# Patient Record
Sex: MALE | Race: WHITE | NOT HISPANIC OR LATINO | Employment: OTHER | URBAN - METROPOLITAN AREA
[De-identification: names, ages, dates, MRNs, and addresses within clinical notes are randomized per-mention and may not be internally consistent; named-entity substitution may affect disease eponyms.]

---

## 2017-01-17 ENCOUNTER — OFFICE VISIT (OUTPATIENT)
Dept: ORTHOPEDIC SURGERY | Facility: CLINIC | Age: 62
End: 2017-01-17

## 2017-01-17 VITALS — WEIGHT: 184 LBS | BODY MASS INDEX: 28.88 KG/M2 | HEIGHT: 67 IN

## 2017-01-17 DIAGNOSIS — M48.061 SPINAL STENOSIS OF LUMBAR REGION: ICD-10-CM

## 2017-01-17 DIAGNOSIS — M48.061 SPINAL STENOSIS OF LUMBAR REGION: Primary | ICD-10-CM

## 2017-01-17 DIAGNOSIS — M54.50 LUMBAR SPINE PAIN: Primary | ICD-10-CM

## 2017-01-17 PROCEDURE — 72100 X-RAY EXAM L-S SPINE 2/3 VWS: CPT | Performed by: ORTHOPAEDIC SURGERY

## 2017-01-17 PROCEDURE — 99214 OFFICE O/P EST MOD 30 MIN: CPT | Performed by: ORTHOPAEDIC SURGERY

## 2017-01-17 RX ORDER — DOXYCYCLINE 40 MG/1
CAPSULE ORAL
Refills: 3 | COMMUNITY
Start: 2017-01-03 | End: 2018-12-19

## 2017-01-17 RX ORDER — LAMOTRIGINE 100 MG/1
TABLET ORAL
Refills: 1 | COMMUNITY
Start: 2017-01-03 | End: 2018-12-19

## 2017-01-17 RX ORDER — METOPROLOL SUCCINATE 50 MG/1
TABLET, EXTENDED RELEASE ORAL
Refills: 0 | COMMUNITY
Start: 2016-12-25 | End: 2018-12-19

## 2017-01-17 RX ORDER — VITAMIN B COMPLEX
CAPSULE ORAL DAILY
COMMUNITY
End: 2018-12-19

## 2017-01-17 NOTE — MR AVS SNAPSHOT
Albert Tellez   2017 9:45 AM   Office Visit    Dept Phone:  [phone]   Encounter #:  [id number]    Provider:  Shane Ridley MD   Department:  [facility] MEDICAL [facility]                Your Full Care Plan              Your Updated Medication List          This list is accurate as of: 17 10:45 AM.  Always use your most recent med list.                doxycycline 40 MG capsule   Commonly known as:  ORACEA       Fish Oil oil       lamoTRIgine 100 MG tablet   Commonly known as:  LaMICtal       metoprolol succinate XL 50 MG 24 hr tablet   Commonly known as:  TOPROL-XL       vitamin b complex capsule capsule       ZOSTAVAX 77756 UNT/0.65ML injection   Generic drug:  zoster vaccine live PF               We Performed the Following     XR Spine Lumbar AP & Lateral       You Were Diagnosed With        Codes Comments    Lumbar spine pain    -  Primary ICD-10-CM: M54.5  ICD-9-CM: 724.2     Spinal stenosis of lumbar region     ICD-10-CM: M48.06  ICD-9-CM: 724.02       Instructions     None    Patient Instructions History      Upcoming Appointments     Visit Type Date Time Department    FOLLOW UP 2017  9:45 AM [facility]    FOLLOW UP 2017  9:00 AM [facility]      [employer] Signup     [facility] [employer] allows you to send messages to your doctor, view your test results, renew your prescriptions, schedule appointments, and more. To sign up, go to [employer] and click on the Sign Up Now link in the New User? box. Enter your [employer] Activation Code exactly as it appears below along with the last four digits of your Social Security Number and your Date of Birth () to complete the sign-up process. If you do not sign up before the expiration date, you must request a new code.    [employer] Activation Code: [email or web address]  Expires: 2017 10:45 AM    If you have questions, you can email [email or web address]  "or call [phone] to talk to our MyChart staff. Remember, [employer] is NOT to be used for urgent needs. For medical emergencies, dial 911.               Other Info from Your Visit           Your Appointments     Jan 31, 2017  9:00 AM EST   Follow Up with Duncan Hernandez MD   [facility] (--)    [address] [place] [address]   [phone]           Arrive 15 minutes prior to appointment.              Allergies     No Known Allergies      Reason for Visit     Lumbar Spine - Establish Care           Vital Signs     Height Weight Body Mass Index Smoking Status          67\" (170.2 cm) 184 lb (83.5 kg) 28.82 kg/m2 Unknown If Ever Smoked        Problems and Diagnoses Noted     Low back pain    -  Primary    Narrowing of spinal canal            "

## 2017-01-17 NOTE — PROGRESS NOTES
New patient or new problem visit    Chief Complaint   Patient presents with   • Lumbar Spine - Establish Care       HPI: Continued complaints of bilateral pain in the legs.  I last saw him in November 2014 and sent him for epidurals which didn't help a lot and certainly didn't last.  He went to physical therapy once.  The pain is severe, incredible radiating into the left hip and leg and also some right thigh pain.  He states is fairly symmetrical but the left hurts more.  He has no low back pain.    PFSH: See chart- reviewed    Review of Systems    PE: Constitutional: Vital signs above-noted.  Awake, alert and oriented    Psychiatric: Affect and insight do not appear grossly disturbed.    Pulmonary: Breathing is unlabored, color is good.    Skin: Warm, dry and normal turgor    Cardiac:  pedal pulses intact.  No edema.    Eyesight and hearing appear adequate for examination purposes      Musculoskeletal:  There is no tenderness to percussion and palpation of the spine. Motion appears undisturbed.  Posture is unremarkable to coronal and sagittal inspection.    The skin about the area is intact.  There is no palpable or visible deformity.  There is no local spasm.       Neurologic:   Reflexes are 2+ and symmetrical in the patellae and absent in the Achilles.   Motor function is undisturbed in quadriceps, EHL, and gastrocnemius      Sensation appears symmetrically intact to light touch   .  In the bilateral lower extremities there is no evidence of atrophy.   Clonus is absent..  Gait appears undisturbed.  SLR test negative      MEDICAL DECISION MAKING    XRAY: Plain film lumbar two-view x-rays show multilevel spondylosis and a well-maintained lordosis, essentially unchanged from 2 years ago.  MRI scan from that time demonstrated moderate stenosis at L3 4 and L4 5.    Other: n/a    Impression: Worsening lumbar spinal stenosis probably L3 4 and L4 5    Plan: Repeat MRI scan of the lumbar spine with an eye toward  laminectomy. I discussed the risks and benefits of laminectomy.  Risks include adverse anesthetic events including death, stroke and myocardial infarction.  More specific surgical complications include infection, nerve root injury and/or paralysis, persistent pain, recurrent pain, spinal fluid leakage, painful scarring, and increased back pain and/or instability, among others. There is a 70 to 90 percent chance of success.   Alternatives were discussed.  After careful consideration the patient wishes to proceed with surgery.

## 2017-01-17 NOTE — LETTER
January 17, 2017     Patient: Albert Tellez   YOB: 1955   Date of Visit: 1/17/2017       To Whom It May Concern:    It is my medical opinion that Albert Tellez may return to light duty immediately with the following restrictions: Avoid lifting greater than 50 pounds.  He has significant leg pain from a back malady and may need further treatment.           Sincerely,        hSane Ridley MD    CC: Albert Tellez

## 2017-01-17 NOTE — LETTER
January 17, 2017     Luci Ramos MD  [address]  [address]  [facility] [address]    Patient: Albert Tellez   YOB: 1955   Date of Visit: 1/17/2017       Dear Dr. Richard MD:    Thank you for referring Albert Tellez to me for evaluation. Below are the relevant portions of my assessment and plan of care.    If you have questions, please do not hesitate to call me. I look forward to following lAbert along with you.         Sincerely,        Shane Ridley MD        CC: No Recipients  Shane Ridley MD  1/17/2017 10:36 AM  Signed  New patient or new problem visit    Chief Complaint   Patient presents with   • Lumbar Spine - Establish Care       HPI: Continued complaints of bilateral pain in the legs.  I last saw him in November 2014 and sent him for epidurals which didn't help a lot and certainly didn't last.  He went to physical therapy once.  The pain is severe, incredible radiating into the left hip and leg and also some right thigh pain.  He states is fairly symmetrical but the left hurts more.  He has no low back pain.    PFSH: See chart- reviewed    Review of Systems    PE: Constitutional: Vital signs above-noted.  Awake, alert and oriented    Psychiatric: Affect and insight do not appear grossly disturbed.    Pulmonary: Breathing is unlabored, color is good.    Skin: Warm, dry and normal turgor    Cardiac:  pedal pulses intact.  No edema.    Eyesight and hearing appear adequate for examination purposes      Musculoskeletal:  There is no tenderness to percussion and palpation of the spine. Motion appears undisturbed.  Posture is unremarkable to coronal and sagittal inspection.    The skin about the area is intact.  There is no palpable or visible deformity.  There is no local spasm.       Neurologic:   Reflexes are 2+ and symmetrical in the patellae and absent in the Achilles.   Motor function is undisturbed in quadriceps, EHL, and gastrocnemius      Sensation appears  symmetrically intact to light touch   .  In the bilateral lower extremities there is no evidence of atrophy.   Clonus is absent..  Gait appears undisturbed.  SLR test negative      MEDICAL DECISION MAKING    XRAY: Plain film lumbar two-view x-rays show multilevel spondylosis and a well-maintained lordosis, essentially unchanged from 2 years ago.  MRI scan from that time demonstrated moderate stenosis at L3 4 and L4 5.    Other: n/a    Impression: Worsening lumbar spinal stenosis probably L3 4 and L4 5    Plan: Repeat MRI scan of the lumbar spine with an eye toward laminectomy. I discussed the risks and benefits of laminectomy.  Risks include adverse anesthetic events including death, stroke and myocardial infarction.  More specific surgical complications include infection, nerve root injury and/or paralysis, persistent pain, recurrent pain, spinal fluid leakage, painful scarring, and increased back pain and/or instability, among others. There is a 70 to 90 percent chance of success.   Alternatives were discussed.  After careful consideration the patient wishes to proceed with surgery.

## 2017-01-27 ENCOUNTER — HOSPITAL ENCOUNTER (OUTPATIENT)
Dept: MRI IMAGING | Facility: HOSPITAL | Age: 62
Discharge: HOME | End: 2017-01-27
Attending: ORTHOPAEDIC SURGERY | Admitting: ORTHOPAEDIC SURGERY

## 2017-01-27 DIAGNOSIS — M48.061 SPINAL STENOSIS OF LUMBAR REGION: ICD-10-CM

## 2017-01-27 PROCEDURE — 72148 MRI LUMBAR SPINE W/O DYE: CPT

## 2017-01-31 ENCOUNTER — OFFICE VISIT (OUTPATIENT)
Dept: ORTHOPEDIC SURGERY | Facility: CLINIC | Age: 62
End: 2017-01-31

## 2017-01-31 VITALS — BODY MASS INDEX: 28.88 KG/M2 | HEIGHT: 67 IN | WEIGHT: 184 LBS

## 2017-01-31 DIAGNOSIS — Z98.890 HISTORY OF HIP SURGERY: Primary | ICD-10-CM

## 2017-01-31 PROCEDURE — 73502 X-RAY EXAM HIP UNI 2-3 VIEWS: CPT | Performed by: NURSE PRACTITIONER

## 2017-01-31 PROCEDURE — 99212 OFFICE O/P EST SF 10 MIN: CPT | Performed by: NURSE PRACTITIONER

## 2017-01-31 NOTE — PROGRESS NOTES
"Patient: Albert Tellez  YOB: 1955 62 y.o. male  Medical Record Number: [id number]    Chief Complaints:   Chief Complaint   Patient presents with   • Left Hip - Follow-up       History of Present Illness:Albert Tellez is a 62 y.o. male who presents for follow-up of  left hip resurfacing.  Patient is now almost 6 years out.  He has had ongoing problems with his lower back and is most recently seen Dr. Ridley and just had an MRI to reevaluate his back.  He is here today to make sure that his pain is not related to his hip.  Specifically he is having pain in his left lower back going all the way down his left leg into his foot.  He also has numbness and tingling in feelings as though\" his leg is asleep\" this is been going on for several months.  The patient denies any specific injury he is been doing quite a bit of lifting at work.    Allergies: No Known Allergies    Medications:   Current Outpatient Prescriptions   Medication Sig Dispense Refill   • B Complex Vitamins (VITAMIN B COMPLEX) capsule capsule Take  by mouth Daily.     • doxycycline (ORACEA) 40 MG capsule TAKE ONE CAPSULE BY MOUTH EVERY DAY  3   • Fish Oil oil Daily.     • lamoTRIgine (LaMICtal) 100 MG tablet TAKE 1 TABLET DAILY.  1   • metoprolol succinate XL (TOPROL-XL) 50 MG 24 hr tablet TAKE 1 TABLET BY MOUTH DAILY  0   • ZOSTAVAX 23734 UNT/0.65ML injection ADM 0.65ML SC UTD  0     No current facility-administered medications for this visit.          The following portions of the patient's history were reviewed and updated as appropriate: allergies, current medications, past family history, past medical history, past social history, past surgical history and problem list.    Review of Systems:   A 14 point review of systems was performed. All systems negative except pertinent positives/negative listed in HPI above    Physical Exam:   Vitals:    01/31/17 0934   Weight: 184 lb (83.5 kg)   Height: 67\" (170.2 cm)       General: A and O x 3, " ASA, NAD    SCLERA:    Normal    DENTITION:   Normal  Skin clear no unusual lesions noted  Left hip patient is nontender palpation he has excellent range of motion with no instability calf is soft and nontender    Radiology:  Xrays 2 views left hip were ordered and reviewed today secondary to leg pain and show well-placed well-positioned left hip resurfacing.  Comparative views are unchanged    Assessment/Plan:  Left hip resurfacing stable  Low back pain with radiculopathy    Dr. Taveras saw the patient as well.  Clearly this is coming from his back.  He will make appointment with Dr. Ridley to further discuss options

## 2017-01-31 NOTE — MR AVS SNAPSHOT
Albert Tellez   2017 9:00 AM   Office Visit    Dept Phone:  [phone]   Encounter #:  [id number]    Provider:  LIANNE Estrada   Department:  [facility] MEDICAL [facility]                Your Full Care Plan              Your Updated Medication List          This list is accurate as of: 17 10:12 AM.  Always use your most recent med list.                doxycycline 40 MG capsule   Commonly known as:  ORACEA       Fish Oil oil       lamoTRIgine 100 MG tablet   Commonly known as:  LaMICtal       metoprolol succinate XL 50 MG 24 hr tablet   Commonly known as:  TOPROL-XL       vitamin b complex capsule capsule       ZOSTAVAX 34243 UNT/0.65ML injection   Generic drug:  zoster vaccine live PF               We Performed the Following     XR Hip With or Without Pelvis 2 - 3 View Left       You Were Diagnosed With        Codes Comments    History of hip surgery    -  Primary ICD-10-CM: Z98.890  ICD-9-CM: V45.89       Instructions     None    Patient Instructions History      Upcoming Appointments     Visit Type Date Time Department    FOLLOW UP 2017  9:00 AM [facility]    FOLLOW UP 2/15/2017 11:20 AM [facility]      [employer] Signup     [facility] [employer] allows you to send messages to your doctor, view your test results, renew your prescriptions, schedule appointments, and more. To sign up, go to [employer] and click on the Sign Up Now link in the New User? box. Enter your [employer] Activation Code exactly as it appears below along with the last four digits of your Social Security Number and your Date of Birth () to complete the sign-up process. If you do not sign up before the expiration date, you must request a new code.    [employer] Activation Code: [email or web address]  Expires: 2017 10:45 AM    If you have questions, you can email [email or web address] or call [phone] to talk to our [employer] staff.  "Remember, MyChart is NOT to be used for urgent needs. For medical emergencies, dial 911.               Other Info from Your Visit           Your Appointments     Feb 15, 2017 11:20 AM EST   Follow Up with Shane Ridley MD   [facility] (--)    [address] [place] [address]   [phone]           Arrive 15 minutes prior to appointment.              Allergies     No Known Allergies      Reason for Visit     Left Hip - Follow-up           Vital Signs     Height Weight Body Mass Index Smoking Status          67\" (170.2 cm) 184 lb (83.5 kg) 28.82 kg/m2 Unknown If Ever Smoked        Problems and Diagnoses Noted     History of hip surgery    -  Primary        "

## 2017-02-01 ENCOUNTER — TELEPHONE (OUTPATIENT)
Dept: ORTHOPEDIC SURGERY | Facility: CLINIC | Age: 62
End: 2017-02-01

## 2017-02-01 NOTE — TELEPHONE ENCOUNTER
----- Message from Shane Ridley MD sent at 1/31/2017 12:51 PM EST -----  Tell him lumbar MRI shows pinched nerves at L3-4 and L4-5.  See if he wants to schedule L3-4,L4-5 laminectomy

## 2017-02-06 ENCOUNTER — TELEPHONE (OUTPATIENT)
Dept: ORTHOPEDIC SURGERY | Facility: CLINIC | Age: 62
End: 2017-02-06

## 2017-02-07 ENCOUNTER — TELEPHONE (OUTPATIENT)
Dept: ORTHOPEDIC SURGERY | Facility: CLINIC | Age: 62
End: 2017-02-07

## 2018-10-23 ENCOUNTER — TELEPHONE (OUTPATIENT)
Dept: ORTHOPEDIC SURGERY | Facility: CLINIC | Age: 63
End: 2018-10-23

## 2018-11-14 ENCOUNTER — OFFICE VISIT (OUTPATIENT)
Dept: ORTHOPEDIC SURGERY | Facility: CLINIC | Age: 63
End: 2018-11-14

## 2018-11-14 VITALS — TEMPERATURE: 98.5 F | WEIGHT: 180 LBS | BODY MASS INDEX: 28.25 KG/M2 | HEIGHT: 67 IN

## 2018-11-14 DIAGNOSIS — M54.50 SPINE PAIN, LUMBAR: Primary | ICD-10-CM

## 2018-11-14 DIAGNOSIS — M48.061 SPINAL STENOSIS OF LUMBAR REGION, UNSPECIFIED WHETHER NEUROGENIC CLAUDICATION PRESENT: ICD-10-CM

## 2018-11-14 PROCEDURE — 99214 OFFICE O/P EST MOD 30 MIN: CPT | Performed by: ORTHOPAEDIC SURGERY

## 2018-11-14 PROCEDURE — 72100 X-RAY EXAM L-S SPINE 2/3 VWS: CPT | Performed by: ORTHOPAEDIC SURGERY

## 2018-11-14 NOTE — PROGRESS NOTES
Returns with predominant hip and buttock and radiating thigh pain right more than left radiating pain much more than back pain.  Epidurals helped the sporadically but conservative care has failed.  The he wants to proceed with surgery on exam he has good strength in lower extremities bilaterally good patellar reflexes I did not test Achilles sensation appears grossly intact.  MRI from January 2017 shows stenosis at L3 4 and 45 for which laminectomy at previously been planned.  X-rays obtained today show good lumbar lordosis no instability perhaps a hint of retrolisthesis at L2-3 no change from prior films.  Think he is an excellent candidate for an L3 4 4 5 laminectomy provided the MRI has not changed, and we will obtain a new one.I discussed the risks and benefits of posterior spinal fusion, including where applicable, laminectomy.  Risks include adverse anesthetic events such as death, stroke and myocardial infarction.  More specific surgical risks include infection, nonunion, hardware failure, spinal fluid leakage, nerve root injury or paralysis, visceral or vascular injury, persistent pain, deep venous thrombosis, and pulmonary embolism among others. There is a 70 to 90 % chance of success.   Alternatives have been discussed.  After careful consideration the patient wishes to proceed with surgery.  I will call him with results of the MRI scan.  Risk and benefits of laminectomy were reiterated along with the advantages of choosing laminectomy over addition of fusion at this time

## 2018-11-16 ENCOUNTER — HOSPITAL ENCOUNTER (OUTPATIENT)
Dept: MRI IMAGING | Facility: HOSPITAL | Age: 63
Discharge: HOME | End: 2018-11-16
Attending: ORTHOPAEDIC SURGERY | Admitting: ORTHOPAEDIC SURGERY

## 2018-11-16 DIAGNOSIS — M48.061 SPINAL STENOSIS OF LUMBAR REGION, UNSPECIFIED WHETHER NEUROGENIC CLAUDICATION PRESENT: ICD-10-CM

## 2018-11-16 PROCEDURE — 72148 MRI LUMBAR SPINE W/O DYE: CPT

## 2018-11-19 ENCOUNTER — TELEPHONE (OUTPATIENT)
Dept: ORTHOPEDIC SURGERY | Facility: CLINIC | Age: 63
End: 2018-11-19

## 2018-11-19 ENCOUNTER — PREP FOR SURGERY (OUTPATIENT)
Dept: OTHER | Facility: HOSPITAL | Age: 63
End: 2018-11-19

## 2018-11-19 DIAGNOSIS — M48.062 SPINAL STENOSIS OF LUMBAR REGION WITH NEUROGENIC CLAUDICATION: Primary | ICD-10-CM

## 2018-11-19 RX ORDER — CEFAZOLIN SODIUM 2 G/100ML
2 INJECTION, SOLUTION INTRAVENOUS ONCE
Status: CANCELLED | OUTPATIENT
Start: 2018-12-27 | End: 2018-11-19

## 2018-12-12 PROBLEM — M48.062 SPINAL STENOSIS OF LUMBAR REGION WITH NEUROGENIC CLAUDICATION: Status: ACTIVE | Noted: 2018-12-12

## 2018-12-17 ENCOUNTER — TELEPHONE (OUTPATIENT)
Dept: ORTHOPEDIC SURGERY | Facility: CLINIC | Age: 63
End: 2018-12-17

## 2018-12-17 NOTE — TELEPHONE ENCOUNTER
Call return to the patient.  He was supposed to have some preadmission testing this week but wasn't sure what time or where to go.  Received the paperwork that was mailed to him and advised him that all the information is on his paperwork.  His preadmission testing is scheduled at 9 AM on December 19.

## 2018-12-17 NOTE — TELEPHONE ENCOUNTER
PLEASE CALL PT TO DISCUSS SOME POST OP QUESTIONS. I HAVE ANSWERED AS MANY AS I CAN AND I MAILED HIM 3 OF THE BACK SURGERY BOOKLETS.        ANNA

## 2018-12-19 ENCOUNTER — APPOINTMENT (OUTPATIENT)
Dept: PREADMISSION TESTING | Facility: HOSPITAL | Age: 63
End: 2018-12-19

## 2018-12-19 VITALS
SYSTOLIC BLOOD PRESSURE: 159 MMHG | TEMPERATURE: 97.9 F | RESPIRATION RATE: 16 BRPM | DIASTOLIC BLOOD PRESSURE: 89 MMHG | HEIGHT: 67 IN | BODY MASS INDEX: 27.78 KG/M2 | HEART RATE: 80 BPM | OXYGEN SATURATION: 96 % | WEIGHT: 177 LBS

## 2018-12-19 LAB
ANION GAP SERPL CALCULATED.3IONS-SCNC: 9.7 MMOL/L
BUN BLD-MCNC: 12 MG/DL (ref 8–23)
BUN/CREAT SERPL: 14.5 (ref 7–25)
CALCIUM SPEC-SCNC: 9.2 MG/DL (ref 8.6–10.5)
CHLORIDE SERPL-SCNC: 101 MMOL/L (ref 98–107)
CO2 SERPL-SCNC: 25.3 MMOL/L (ref 22–29)
CREAT BLD-MCNC: 0.83 MG/DL (ref 0.76–1.27)
DEPRECATED RDW RBC AUTO: 50.3 FL (ref 37–54)
ERYTHROCYTE [DISTWIDTH] IN BLOOD BY AUTOMATED COUNT: 14.4 % (ref 11.5–14.5)
GFR SERPL CREATININE-BSD FRML MDRD: 114 ML/MIN/1.73
GFR SERPL CREATININE-BSD FRML MDRD: 94 ML/MIN/1.73
GLUCOSE BLD-MCNC: 102 MG/DL (ref 65–99)
HCT VFR BLD AUTO: 39.7 % (ref 40.4–52.2)
HGB BLD-MCNC: 12.9 G/DL (ref 13.7–17.6)
MCH RBC QN AUTO: 31.2 PG (ref 27–32.7)
MCHC RBC AUTO-ENTMCNC: 32.5 G/DL (ref 32.6–36.4)
MCV RBC AUTO: 96.1 FL (ref 79.8–96.2)
PLATELET # BLD AUTO: 285 10*3/MM3 (ref 140–500)
PMV BLD AUTO: 9.8 FL (ref 6–12)
POTASSIUM BLD-SCNC: 4.4 MMOL/L (ref 3.5–5.2)
RBC # BLD AUTO: 4.13 10*6/MM3 (ref 4.6–6)
SODIUM BLD-SCNC: 136 MMOL/L (ref 136–145)
WBC NRBC COR # BLD: 7.3 10*3/MM3 (ref 4.5–10.7)

## 2018-12-19 PROCEDURE — 93010 ELECTROCARDIOGRAM REPORT: CPT | Performed by: INTERNAL MEDICINE

## 2018-12-19 PROCEDURE — 93005 ELECTROCARDIOGRAM TRACING: CPT

## 2018-12-19 PROCEDURE — 36415 COLL VENOUS BLD VENIPUNCTURE: CPT

## 2018-12-19 PROCEDURE — 80048 BASIC METABOLIC PNL TOTAL CA: CPT | Performed by: ORTHOPAEDIC SURGERY

## 2018-12-19 PROCEDURE — 85027 COMPLETE CBC AUTOMATED: CPT | Performed by: ORTHOPAEDIC SURGERY

## 2018-12-19 RX ORDER — DOXYCYCLINE HYCLATE 100 MG/1
100 CAPSULE ORAL DAILY
COMMUNITY

## 2018-12-19 NOTE — DISCHARGE INSTRUCTIONS
Take the following medications the morning of surgery with a small sip of water: NONE        General Instructions:  • Do not eat solid food after midnight the night before surgery.  • You may drink clear liquids day of surgery but must stop at least one hour before your hospital arrival time.  • It is beneficial for you to have a clear drink that contains carbohydrates the day of surgery.  We suggest a 12 to 20 ounce bottle of Gatorade or Powerade for non-diabetic patients or a 12 to 20 ounce bottle of G2 or Powerade Zero for diabetic patients.     Clear liquids are liquids you can see through.  Nothing red in color.     Plain water                               Sports drinks  Sodas                                   Gelatin (Jell-O)  Fruit juices without pulp such as white grape juice and apple juice  Popsicles that contain no fruit or yogurt  Tea or coffee (no cream or milk added)  Gatorade / Powerade  G2 / Powerade Zero    • Bring any papers given to you in the doctor’s office.  • Wear clean comfortable clothes and socks.  • Do not wear contact lenses or make-up.  Bring a case for your glasses.   • Remove all piercings.  Leave jewelry and any other valuables at home.  • The Pre-Admission Testing nurse will instruct you to bring medications if unable to obtain an accurate list in Pre-Admission Testing.            Preventing a Surgical Site Infection:  • For 2 to 3 days before surgery, avoid shaving with a razor because the razor can irritate skin and make it easier to develop an infection.    • Any areas of open skin can increase the risk of a post-operative wound infection by allowing bacteria to enter and travel throughout the body.  Notify your surgeon if you have any skin wounds / rashes even if it is not near the expected surgical site.  The area will need assessed to determine if surgery should be delayed until it is healed.  • The night prior to surgery sleep in a clean bed with clean clothing.  Do not allow  pets to sleep with you.  • Shower on the morning of surgery using a fresh bar of anti-bacterial soap (such as Dial) and clean washcloth.  Dry with a clean towel and dress in clean clothing.  • Ask your surgeon if you will be receiving antibiotics prior to surgery.  • Make sure you, your family, and all healthcare providers clean their hands with soap and water or an alcohol based hand  before caring for you or your wound.    Day of surgery: 12/27/2018. MAIN OR. ARRIVAL TIME 530 AM  Upon arrival, a Pre-op nurse and Anesthesiologist will review your health history, obtain vital signs, and answer questions you may have.  The only belongings needed at this time will be your home medications and if applicable your C-PAP/BI-PAP machine.  If you are staying overnight your family can leave the rest of your belongings in the car and bring them to your room later.  A Pre-op nurse will start an IV and you may receive medication in preparation for surgery, including something to help you relax.  Your family will be able to see you in the Pre-op area.  While you are in surgery your family should notify the waiting room  if they leave the waiting room area and provide a contact phone number.    Please be aware that surgery does come with discomfort.  We want to make every effort to control your discomfort so please discuss any uncontrolled symptoms with your nurse.   Your doctor will most likely have prescribed pain medications.          If you are staying overnight following surgery, you will be transported to your hospital room following the recovery period.  [facility] has all private rooms.    You have received a list of surgical assistants for your reference.  If you have any questions please call Pre-Admission Testing at [phone].  Deductibles and co-payments are collected on the day of service. Please be prepared to pay the required co-pay, deductible or deposit on the day of service as  defined by your plan.

## 2018-12-27 ENCOUNTER — ANESTHESIA EVENT (OUTPATIENT)
Dept: PERIOP | Facility: HOSPITAL | Age: 63
End: 2018-12-27

## 2018-12-27 ENCOUNTER — APPOINTMENT (OUTPATIENT)
Dept: GENERAL RADIOLOGY | Facility: HOSPITAL | Age: 63
End: 2018-12-27

## 2018-12-27 ENCOUNTER — HOSPITAL ENCOUNTER (OUTPATIENT)
Facility: HOSPITAL | Age: 63
Setting detail: HOSPITAL OUTPATIENT SURGERY
Discharge: HOME | End: 2018-12-27
Attending: ORTHOPAEDIC SURGERY | Admitting: ORTHOPAEDIC SURGERY

## 2018-12-27 ENCOUNTER — ANESTHESIA (OUTPATIENT)
Dept: PERIOP | Facility: HOSPITAL | Age: 63
End: 2018-12-27

## 2018-12-27 VITALS
HEIGHT: 67 IN | WEIGHT: 177.69 LBS | SYSTOLIC BLOOD PRESSURE: 142 MMHG | DIASTOLIC BLOOD PRESSURE: 84 MMHG | OXYGEN SATURATION: 95 % | BODY MASS INDEX: 27.89 KG/M2 | TEMPERATURE: 98.2 F | RESPIRATION RATE: 16 BRPM | HEART RATE: 75 BPM

## 2018-12-27 DIAGNOSIS — M48.062 SPINAL STENOSIS OF LUMBAR REGION WITH NEUROGENIC CLAUDICATION: ICD-10-CM

## 2018-12-27 PROBLEM — M48.061 LUMBAR SPINAL STENOSIS: Status: ACTIVE | Noted: 2018-12-27

## 2018-12-27 LAB
ABO GROUP BLD: NORMAL
BLD GP AB SCN SERPL QL: NEGATIVE
RH BLD: POSITIVE
T&S EXPIRATION DATE: NORMAL

## 2018-12-27 PROCEDURE — 86850 RBC ANTIBODY SCREEN: CPT | Performed by: ORTHOPAEDIC SURGERY

## 2018-12-27 PROCEDURE — 72100 X-RAY EXAM L-S SPINE 2/3 VWS: CPT

## 2018-12-27 PROCEDURE — 86900 BLOOD TYPING SEROLOGIC ABO: CPT | Performed by: ORTHOPAEDIC SURGERY

## 2018-12-27 PROCEDURE — 99024 POSTOP FOLLOW-UP VISIT: CPT | Performed by: ORTHOPAEDIC SURGERY

## 2018-12-27 PROCEDURE — 25010000002 MIDAZOLAM PER 1 MG: Performed by: ANESTHESIOLOGY

## 2018-12-27 PROCEDURE — S0260 H&P FOR SURGERY: HCPCS | Performed by: ORTHOPAEDIC SURGERY

## 2018-12-27 PROCEDURE — 25010000002 ONDANSETRON PER 1 MG: Performed by: NURSE ANESTHETIST, CERTIFIED REGISTERED

## 2018-12-27 PROCEDURE — 25010000003 CEFAZOLIN PER 500 MG: Performed by: ORTHOPAEDIC SURGERY

## 2018-12-27 PROCEDURE — 25010000002 DEXAMETHASONE PER 1 MG: Performed by: NURSE ANESTHETIST, CERTIFIED REGISTERED

## 2018-12-27 PROCEDURE — 25010000002 KETOROLAC TROMETHAMINE PER 15 MG: Performed by: NURSE ANESTHETIST, CERTIFIED REGISTERED

## 2018-12-27 PROCEDURE — 25010000002 FENTANYL CITRATE (PF) 100 MCG/2ML SOLUTION: Performed by: ANESTHESIOLOGY

## 2018-12-27 PROCEDURE — 63047 LAM FACETEC & FORAMOT LUMBAR: CPT | Performed by: ORTHOPAEDIC SURGERY

## 2018-12-27 PROCEDURE — 76000 FLUOROSCOPY <1 HR PHYS/QHP: CPT

## 2018-12-27 PROCEDURE — 25010000002 HYDROMORPHONE PER 4 MG: Performed by: NURSE ANESTHETIST, CERTIFIED REGISTERED

## 2018-12-27 PROCEDURE — 25010000002 PROPOFOL 10 MG/ML EMULSION: Performed by: NURSE ANESTHETIST, CERTIFIED REGISTERED

## 2018-12-27 PROCEDURE — 86901 BLOOD TYPING SEROLOGIC RH(D): CPT | Performed by: ORTHOPAEDIC SURGERY

## 2018-12-27 PROCEDURE — 25010000003 CEFAZOLIN IN DEXTROSE 2-4 GM/100ML-% SOLUTION: Performed by: ORTHOPAEDIC SURGERY

## 2018-12-27 PROCEDURE — 63048 LAM FACETEC &FORAMOT EA ADDL: CPT | Performed by: ORTHOPAEDIC SURGERY

## 2018-12-27 RX ORDER — ROCURONIUM BROMIDE 10 MG/ML
INJECTION, SOLUTION INTRAVENOUS AS NEEDED
Status: DISCONTINUED | OUTPATIENT
Start: 2018-12-27 | End: 2018-12-27 | Stop reason: SURG

## 2018-12-27 RX ORDER — PROMETHAZINE HYDROCHLORIDE 25 MG/1
25 TABLET ORAL ONCE AS NEEDED
Status: DISCONTINUED | OUTPATIENT
Start: 2018-12-27 | End: 2018-12-27 | Stop reason: HOSPADM

## 2018-12-27 RX ORDER — HYDROMORPHONE HCL 110MG/55ML
PATIENT CONTROLLED ANALGESIA SYRINGE INTRAVENOUS AS NEEDED
Status: DISCONTINUED | OUTPATIENT
Start: 2018-12-27 | End: 2018-12-27 | Stop reason: SURG

## 2018-12-27 RX ORDER — MIDAZOLAM HYDROCHLORIDE 1 MG/ML
1 INJECTION INTRAMUSCULAR; INTRAVENOUS
Status: DISCONTINUED | OUTPATIENT
Start: 2018-12-27 | End: 2018-12-27 | Stop reason: HOSPADM

## 2018-12-27 RX ORDER — LIDOCAINE HYDROCHLORIDE 10 MG/ML
0.5 INJECTION, SOLUTION EPIDURAL; INFILTRATION; INTRACAUDAL; PERINEURAL ONCE AS NEEDED
Status: DISCONTINUED | OUTPATIENT
Start: 2018-12-27 | End: 2018-12-27 | Stop reason: HOSPADM

## 2018-12-27 RX ORDER — OXYCODONE AND ACETAMINOPHEN 7.5; 325 MG/1; MG/1
TABLET ORAL
Qty: 45 TABLET | Refills: 0 | Status: SHIPPED | OUTPATIENT
Start: 2018-12-27 | End: 2019-01-02 | Stop reason: SDUPTHER

## 2018-12-27 RX ORDER — HYDROCODONE BITARTRATE AND ACETAMINOPHEN 7.5; 325 MG/1; MG/1
1 TABLET ORAL ONCE AS NEEDED
Status: DISCONTINUED | OUTPATIENT
Start: 2018-12-27 | End: 2018-12-27 | Stop reason: HOSPADM

## 2018-12-27 RX ORDER — DEXAMETHASONE SODIUM PHOSPHATE 10 MG/ML
INJECTION INTRAMUSCULAR; INTRAVENOUS AS NEEDED
Status: DISCONTINUED | OUTPATIENT
Start: 2018-12-27 | End: 2018-12-27 | Stop reason: SURG

## 2018-12-27 RX ORDER — MIDAZOLAM HYDROCHLORIDE 1 MG/ML
2 INJECTION INTRAMUSCULAR; INTRAVENOUS
Status: DISCONTINUED | OUTPATIENT
Start: 2018-12-27 | End: 2018-12-27 | Stop reason: HOSPADM

## 2018-12-27 RX ORDER — OXYCODONE AND ACETAMINOPHEN 7.5; 325 MG/1; MG/1
1 TABLET ORAL ONCE AS NEEDED
Status: COMPLETED | OUTPATIENT
Start: 2018-12-27 | End: 2018-12-27

## 2018-12-27 RX ORDER — HYDROMORPHONE HYDROCHLORIDE 1 MG/ML
0.5 INJECTION, SOLUTION INTRAMUSCULAR; INTRAVENOUS; SUBCUTANEOUS
Status: DISCONTINUED | OUTPATIENT
Start: 2018-12-27 | End: 2018-12-27 | Stop reason: HOSPADM

## 2018-12-27 RX ORDER — PROPOFOL 10 MG/ML
VIAL (ML) INTRAVENOUS AS NEEDED
Status: DISCONTINUED | OUTPATIENT
Start: 2018-12-27 | End: 2018-12-27 | Stop reason: SURG

## 2018-12-27 RX ORDER — LIDOCAINE HYDROCHLORIDE 40 MG/ML
SOLUTION TOPICAL AS NEEDED
Status: DISCONTINUED | OUTPATIENT
Start: 2018-12-27 | End: 2018-12-27 | Stop reason: SURG

## 2018-12-27 RX ORDER — LIDOCAINE HYDROCHLORIDE 20 MG/ML
INJECTION, SOLUTION INFILTRATION; PERINEURAL AS NEEDED
Status: DISCONTINUED | OUTPATIENT
Start: 2018-12-27 | End: 2018-12-27 | Stop reason: SURG

## 2018-12-27 RX ORDER — SODIUM CHLORIDE 0.9 % (FLUSH) 0.9 %
1-10 SYRINGE (ML) INJECTION AS NEEDED
Status: DISCONTINUED | OUTPATIENT
Start: 2018-12-27 | End: 2018-12-27 | Stop reason: HOSPADM

## 2018-12-27 RX ORDER — CYCLOBENZAPRINE HCL 10 MG
10 TABLET ORAL NIGHTLY PRN
Qty: 30 TABLET | Refills: 0 | Status: ON HOLD | OUTPATIENT
Start: 2018-12-27 | End: 2020-07-06

## 2018-12-27 RX ORDER — SODIUM CHLORIDE, SODIUM LACTATE, POTASSIUM CHLORIDE, CALCIUM CHLORIDE 600; 310; 30; 20 MG/100ML; MG/100ML; MG/100ML; MG/100ML
9 INJECTION, SOLUTION INTRAVENOUS CONTINUOUS
Status: DISCONTINUED | OUTPATIENT
Start: 2018-12-27 | End: 2018-12-27 | Stop reason: HOSPADM

## 2018-12-27 RX ORDER — ONDANSETRON 2 MG/ML
INJECTION INTRAMUSCULAR; INTRAVENOUS AS NEEDED
Status: DISCONTINUED | OUTPATIENT
Start: 2018-12-27 | End: 2018-12-27 | Stop reason: SURG

## 2018-12-27 RX ORDER — KETOROLAC TROMETHAMINE 30 MG/ML
INJECTION, SOLUTION INTRAMUSCULAR; INTRAVENOUS AS NEEDED
Status: DISCONTINUED | OUTPATIENT
Start: 2018-12-27 | End: 2018-12-27 | Stop reason: SURG

## 2018-12-27 RX ORDER — ONDANSETRON 2 MG/ML
4 INJECTION INTRAMUSCULAR; INTRAVENOUS ONCE AS NEEDED
Status: DISCONTINUED | OUTPATIENT
Start: 2018-12-27 | End: 2018-12-27 | Stop reason: HOSPADM

## 2018-12-27 RX ORDER — PROMETHAZINE HYDROCHLORIDE 25 MG/1
25 SUPPOSITORY RECTAL ONCE AS NEEDED
Status: DISCONTINUED | OUTPATIENT
Start: 2018-12-27 | End: 2018-12-27 | Stop reason: HOSPADM

## 2018-12-27 RX ORDER — FENTANYL CITRATE 50 UG/ML
50 INJECTION, SOLUTION INTRAMUSCULAR; INTRAVENOUS
Status: DISCONTINUED | OUTPATIENT
Start: 2018-12-27 | End: 2018-12-27 | Stop reason: HOSPADM

## 2018-12-27 RX ORDER — DIPHENHYDRAMINE HCL 25 MG
25 CAPSULE ORAL
Status: DISCONTINUED | OUTPATIENT
Start: 2018-12-27 | End: 2018-12-27 | Stop reason: HOSPADM

## 2018-12-27 RX ORDER — PROMETHAZINE HYDROCHLORIDE 25 MG/ML
12.5 INJECTION, SOLUTION INTRAMUSCULAR; INTRAVENOUS ONCE AS NEEDED
Status: DISCONTINUED | OUTPATIENT
Start: 2018-12-27 | End: 2018-12-27 | Stop reason: HOSPADM

## 2018-12-27 RX ORDER — MEPERIDINE HYDROCHLORIDE 25 MG/ML
12.5 INJECTION INTRAMUSCULAR; INTRAVENOUS; SUBCUTANEOUS
Status: DISCONTINUED | OUTPATIENT
Start: 2018-12-27 | End: 2018-12-27 | Stop reason: HOSPADM

## 2018-12-27 RX ORDER — FAMOTIDINE 10 MG/ML
20 INJECTION, SOLUTION INTRAVENOUS ONCE
Status: COMPLETED | OUTPATIENT
Start: 2018-12-27 | End: 2018-12-27

## 2018-12-27 RX ORDER — CEFAZOLIN SODIUM 2 G/100ML
2 INJECTION, SOLUTION INTRAVENOUS ONCE
Status: COMPLETED | OUTPATIENT
Start: 2018-12-27 | End: 2018-12-27

## 2018-12-27 RX ORDER — NALOXONE HCL 0.4 MG/ML
0.2 VIAL (ML) INJECTION AS NEEDED
Status: DISCONTINUED | OUTPATIENT
Start: 2018-12-27 | End: 2018-12-27 | Stop reason: HOSPADM

## 2018-12-27 RX ORDER — FLUMAZENIL 0.1 MG/ML
0.2 INJECTION INTRAVENOUS AS NEEDED
Status: DISCONTINUED | OUTPATIENT
Start: 2018-12-27 | End: 2018-12-27 | Stop reason: HOSPADM

## 2018-12-27 RX ORDER — ACETAMINOPHEN 325 MG/1
650 TABLET ORAL ONCE AS NEEDED
Status: DISCONTINUED | OUTPATIENT
Start: 2018-12-27 | End: 2018-12-27 | Stop reason: HOSPADM

## 2018-12-27 RX ORDER — EPHEDRINE SULFATE 50 MG/ML
5 INJECTION, SOLUTION INTRAVENOUS ONCE AS NEEDED
Status: DISCONTINUED | OUTPATIENT
Start: 2018-12-27 | End: 2018-12-27 | Stop reason: HOSPADM

## 2018-12-27 RX ORDER — BUPIVACAINE HYDROCHLORIDE AND EPINEPHRINE 2.5; 5 MG/ML; UG/ML
INJECTION, SOLUTION INFILTRATION; PERINEURAL AS NEEDED
Status: DISCONTINUED | OUTPATIENT
Start: 2018-12-27 | End: 2018-12-27 | Stop reason: HOSPADM

## 2018-12-27 RX ORDER — SODIUM CHLORIDE, SODIUM LACTATE, POTASSIUM CHLORIDE, CALCIUM CHLORIDE 600; 310; 30; 20 MG/100ML; MG/100ML; MG/100ML; MG/100ML
INJECTION, SOLUTION INTRAVENOUS CONTINUOUS PRN
Status: DISCONTINUED | OUTPATIENT
Start: 2018-12-27 | End: 2018-12-27 | Stop reason: SURG

## 2018-12-27 RX ADMIN — FENTANYL CITRATE 50 MCG: 50 INJECTION INTRAMUSCULAR; INTRAVENOUS at 08:12

## 2018-12-27 RX ADMIN — Medication 2 MG: at 06:47

## 2018-12-27 RX ADMIN — FENTANYL CITRATE 50 MCG: 50 INJECTION INTRAMUSCULAR; INTRAVENOUS at 09:15

## 2018-12-27 RX ADMIN — FENTANYL CITRATE 50 MCG: 50 INJECTION INTRAMUSCULAR; INTRAVENOUS at 07:40

## 2018-12-27 RX ADMIN — LIDOCAINE HYDROCHLORIDE 1 EACH: 40 SOLUTION TOPICAL at 07:42

## 2018-12-27 RX ADMIN — Medication 2 MG: at 07:36

## 2018-12-27 RX ADMIN — LIDOCAINE HYDROCHLORIDE 80 MG: 20 INJECTION, SOLUTION INFILTRATION; PERINEURAL at 07:40

## 2018-12-27 RX ADMIN — OXYCODONE HYDROCHLORIDE AND ACETAMINOPHEN 1 TABLET: 7.5; 325 TABLET ORAL at 09:52

## 2018-12-27 RX ADMIN — ROCURONIUM BROMIDE 50 MG: 10 INJECTION INTRAVENOUS at 07:40

## 2018-12-27 RX ADMIN — ONDANSETRON 4 MG: 2 INJECTION INTRAMUSCULAR; INTRAVENOUS at 09:11

## 2018-12-27 RX ADMIN — KETOROLAC TROMETHAMINE 30 MG: 30 INJECTION, SOLUTION INTRAMUSCULAR; INTRAVENOUS at 09:11

## 2018-12-27 RX ADMIN — SODIUM CHLORIDE, POTASSIUM CHLORIDE, SODIUM LACTATE AND CALCIUM CHLORIDE: 600; 310; 30; 20 INJECTION, SOLUTION INTRAVENOUS at 08:54

## 2018-12-27 RX ADMIN — SODIUM CHLORIDE, POTASSIUM CHLORIDE, SODIUM LACTATE AND CALCIUM CHLORIDE: 600; 310; 30; 20 INJECTION, SOLUTION INTRAVENOUS at 06:53

## 2018-12-27 RX ADMIN — CEFAZOLIN SODIUM 2 G: 2 INJECTION, SOLUTION INTRAVENOUS at 07:36

## 2018-12-27 RX ADMIN — HYDROMORPHONE HYDROCHLORIDE 0.5 MG: 2 INJECTION INTRAMUSCULAR; INTRAVENOUS; SUBCUTANEOUS at 08:50

## 2018-12-27 RX ADMIN — PROPOFOL 200 MG: 10 INJECTION, EMULSION INTRAVENOUS at 07:40

## 2018-12-27 RX ADMIN — DEXAMETHASONE SODIUM PHOSPHATE 10 MG: 10 INJECTION INTRAMUSCULAR; INTRAVENOUS at 07:59

## 2018-12-27 RX ADMIN — SODIUM CHLORIDE, POTASSIUM CHLORIDE, SODIUM LACTATE AND CALCIUM CHLORIDE 9 ML/HR: 600; 310; 30; 20 INJECTION, SOLUTION INTRAVENOUS at 06:47

## 2018-12-27 RX ADMIN — SUGAMMADEX 200 MG: 100 INJECTION, SOLUTION INTRAVENOUS at 09:11

## 2018-12-27 RX ADMIN — FAMOTIDINE 20 MG: 10 INJECTION, SOLUTION INTRAVENOUS at 06:47

## 2018-12-27 NOTE — ANESTHESIA PREPROCEDURE EVALUATION
Anesthesia Evaluation     Patient summary reviewed and Nursing notes reviewed   NPO Solid Status: > 8 hours  NPO Liquid Status: > 4 hours           Airway   Mallampati: II  Neck ROM: full  No difficulty expected  Dental - normal exam     Pulmonary     breath sounds clear to auscultation  (+) a smoker Former,   Cardiovascular     Rhythm: regular    (+) hypertension,       Neuro/Psych  GI/Hepatic/Renal/Endo      Musculoskeletal     Abdominal    Substance History      OB/GYN          Other                        Anesthesia Plan    ASA 2     general   (Prone position discussed)  intravenous induction   Anesthetic plan, all risks, benefits, and alternatives have been provided, discussed and informed consent has been obtained with: patient.

## 2018-12-27 NOTE — ANESTHESIA POSTPROCEDURE EVALUATION
Patient: Albert Tellez    Procedure Summary     Date:  12/27/18 Room / Location:  [facility] OR [address] [place] [address] MAIN OR    Anesthesia Start:  0734 Anesthesia Stop:  0930    Procedure:  L3-4,4-5 laminectomy (N/A Spine Lumbar) Diagnosis:       Spinal stenosis of lumbar region with neurogenic claudication      (Spinal stenosis of lumbar region with neurogenic claudication [M48.062])    Surgeon:  Shane Ridley MD Provider:  Kin Marcsu MD    Anesthesia Type:  general ASA Status:  2          Anesthesia Type: general  Last vitals  BP   156/88 (12/27/18 0940)   Temp   36.8 °C (98.2 °F) (12/27/18 0927)   Pulse   100 (12/27/18 0945)   Resp   16 (12/27/18 0940)     SpO2   95 % (12/27/18 0940)     Post Anesthesia Care and Evaluation    Patient location during evaluation: PACU  Patient participation: complete - patient participated  Level of consciousness: awake and alert  Pain management: adequate  Airway patency: patent  Anesthetic complications: No anesthetic complications    Cardiovascular status: acceptable  Respiratory status: acceptable  Hydration status: acceptable    Comments: --------------------            12/27/18 0945     --------------------   BP:                  Pulse:     100       Resp:                Temp:                SpO2:               --------------------

## 2018-12-27 NOTE — ANESTHESIA PROCEDURE NOTES
ANESTHESIA INTUBATION  Urgency: elective    Date/Time: 12/27/2018 7:42 AM  Airway not difficult    General Information and Staff    Patient location during procedure: OR  Anesthesiologist: Kin Marcus MD  CRNA: Dm Fontanez CRNA    Indications and Patient Condition  Indications for airway management: airway protection    Preoxygenated: yes  MILS not maintained throughout  Mask difficulty assessment: 1 - vent by mask    Final Airway Details  Final airway type: endotracheal airway      Successful airway: ETT and reinforced tube  Cuffed: yes   Successful intubation technique: direct laryngoscopy  Facilitating devices/methods: anterior pressure/BURP  Endotracheal tube insertion site: oral  Blade: Felisha  Blade size: 3  ETT size (mm): 7.5  Cormack-Lehane Classification: grade IIa - partial view of glottis  Placement verified by: chest auscultation   Cuff volume (mL): 7  Measured from: lips  ETT to lips (cm): 21  Number of attempts at approach: 1    Additional Comments  Pre O2, SIAI

## 2018-12-28 ENCOUNTER — TELEPHONE (OUTPATIENT)
Dept: ORTHOPEDIC SURGERY | Facility: CLINIC | Age: 63
End: 2018-12-28

## 2019-01-02 RX ORDER — OXYCODONE AND ACETAMINOPHEN 7.5; 325 MG/1; MG/1
TABLET ORAL
Qty: 45 TABLET | Refills: 0 | Status: ON HOLD | OUTPATIENT
Start: 2019-01-02 | End: 2020-07-06

## 2019-01-02 NOTE — TELEPHONE ENCOUNTER
Have spoken with the patient.  He is complaining of some pain and muscle spasm in his buttocks and both hamstrings.  I after lengthy discussion patient may be overdoing it.  He is walking at least 20 minutes at a time and has to do stairs to get to and from his bedroom.  I does get relief with the Flexeril better than the Percocet although his Flexeril is only ordered once a day as needed.  Have advised him that he can increase the Flexeril to every 8 hours as needed for muscle spasm.  Patient does have enough Flexeril to last through Friday.  Have ask him to decrease his activities for the next day or so.  He should avoid any forward bending or twisting at the waist.  Also should avoid sitting and any low chair for any long periods of time.  Would recommend that he use the stairs only once a day.  Have instructed him to contact me back at the office on Friday if his symptoms do not improve that are trying him on a Medrol Dosepak.  Patient states that he only has 3 Percocet tablets left and will send a refill quest to the physician

## 2019-01-07 ENCOUNTER — TELEPHONE (OUTPATIENT)
Dept: ORTHOPEDIC SURGERY | Facility: CLINIC | Age: 64
End: 2019-01-07

## 2019-01-07 NOTE — TELEPHONE ENCOUNTER
Spoke with patient.  He is doing much better.  Have advised him to gradually increase activities, but needs to maintain postop restrictions.  He is scheduled to see LUZ MARIAW on Friday for follow-up.

## 2019-01-11 ENCOUNTER — OFFICE VISIT (OUTPATIENT)
Dept: ORTHOPEDIC SURGERY | Facility: CLINIC | Age: 64
End: 2019-01-11

## 2019-01-11 VITALS — BODY MASS INDEX: 26.68 KG/M2 | WEIGHT: 170 LBS | TEMPERATURE: 99.1 F | HEIGHT: 67 IN

## 2019-01-11 DIAGNOSIS — M48.062 SPINAL STENOSIS OF LUMBAR REGION WITH NEUROGENIC CLAUDICATION: Primary | ICD-10-CM

## 2019-01-11 PROCEDURE — 99024 POSTOP FOLLOW-UP VISIT: CPT | Performed by: ORTHOPAEDIC SURGERY

## 2019-01-11 NOTE — PROGRESS NOTES
2 weeks out from lumbar laminectomy no leg pain, expected back pain and some stooping posture I encouraged him to stand erect.  The wound looks fine.  The take his pain medication and had Motrin will start therapy in a month.  He'll also use a Warm 'n Form corset.  No x-rays needed on return visit.

## 2019-02-06 ENCOUNTER — OFFICE VISIT (OUTPATIENT)
Dept: ORTHOPEDIC SURGERY | Facility: CLINIC | Age: 64
End: 2019-02-06

## 2019-02-06 ENCOUNTER — TELEPHONE (OUTPATIENT)
Dept: ORTHOPEDIC SURGERY | Facility: CLINIC | Age: 64
End: 2019-02-06

## 2019-02-06 VITALS — HEIGHT: 68 IN | TEMPERATURE: 98.2 F | BODY MASS INDEX: 26.19 KG/M2 | WEIGHT: 172.8 LBS

## 2019-02-06 DIAGNOSIS — M48.061 SPINAL STENOSIS OF LUMBAR REGION WITHOUT NEUROGENIC CLAUDICATION: Primary | ICD-10-CM

## 2019-02-06 PROCEDURE — 99024 POSTOP FOLLOW-UP VISIT: CPT | Performed by: ORTHOPAEDIC SURGERY

## 2019-02-06 RX ORDER — METHYLPREDNISOLONE 4 MG/1
TABLET ORAL
Qty: 21 TABLET | Refills: 0 | Status: ON HOLD | OUTPATIENT
Start: 2019-02-06 | End: 2020-07-06

## 2019-02-06 NOTE — TELEPHONE ENCOUNTER
See what's up.  As far as I'm concerned he can do full duty work if he feels he is ready.  If not I will support him in a bit more time off for reduced activity for a few more weeks

## 2019-02-06 NOTE — PROGRESS NOTES
Leg pain is better but still with some left flank pain shooting causing him to lean forward at times I can't elicit tenderness on exam in this area and is otherwise neurologically intact two-view x-rays were obtained and the, and I did not charge him for these no change from prior films.  I've recommended physical therapy and a Dosepak and I'll see him back as needed

## 2020-06-27 ENCOUNTER — HOSPITAL ENCOUNTER (INPATIENT)
Facility: HOSPITAL | Age: 65
LOS: 9 days | Discharge: HOME | End: 2020-07-07
Attending: EMERGENCY MEDICINE | Admitting: INTERNAL MEDICINE

## 2020-06-27 DIAGNOSIS — F10.931 DTS (DELIRIUM TREMENS) (HCC): Primary | ICD-10-CM

## 2020-06-27 DIAGNOSIS — R53.1 GENERALIZED WEAKNESS: ICD-10-CM

## 2020-06-27 LAB
BASOPHILS # BLD AUTO: 0.01 10*3/MM3 (ref 0–0.2)
BASOPHILS NFR BLD AUTO: 0.2 % (ref 0–1.5)
DEPRECATED RDW RBC AUTO: 42.1 FL (ref 37–54)
EOSINOPHIL # BLD AUTO: 0.08 10*3/MM3 (ref 0–0.4)
EOSINOPHIL NFR BLD AUTO: 1.8 % (ref 0.3–6.2)
ERYTHROCYTE [DISTWIDTH] IN BLOOD BY AUTOMATED COUNT: 11.6 % (ref 12.3–15.4)
HCT VFR BLD AUTO: 34.8 % (ref 37.5–51)
HGB BLD-MCNC: 12.6 G/DL (ref 13–17.7)
IMM GRANULOCYTES # BLD AUTO: 0.02 10*3/MM3 (ref 0–0.05)
IMM GRANULOCYTES NFR BLD AUTO: 0.5 % (ref 0–0.5)
LYMPHOCYTES # BLD AUTO: 0.63 10*3/MM3 (ref 0.7–3.1)
LYMPHOCYTES NFR BLD AUTO: 14.5 % (ref 19.6–45.3)
MCH RBC QN AUTO: 36.3 PG (ref 26.6–33)
MCHC RBC AUTO-ENTMCNC: 36.2 G/DL (ref 31.5–35.7)
MCV RBC AUTO: 100.3 FL (ref 79–97)
MONOCYTES # BLD AUTO: 0.57 10*3/MM3 (ref 0.1–0.9)
MONOCYTES NFR BLD AUTO: 13.1 % (ref 5–12)
NEUTROPHILS # BLD AUTO: 3.03 10*3/MM3 (ref 1.7–7)
NEUTROPHILS NFR BLD AUTO: 69.9 % (ref 42.7–76)
NRBC BLD AUTO-RTO: 0 /100 WBC (ref 0–0.2)
PLATELET # BLD AUTO: 109 10*3/MM3 (ref 140–450)
PMV BLD AUTO: 9.8 FL (ref 6–12)
RBC # BLD AUTO: 3.47 10*6/MM3 (ref 4.14–5.8)
WBC NRBC COR # BLD: 4.34 10*3/MM3 (ref 3.4–10.8)

## 2020-06-27 PROCEDURE — 82550 ASSAY OF CK (CPK): CPT | Performed by: EMERGENCY MEDICINE

## 2020-06-27 PROCEDURE — 80053 COMPREHEN METABOLIC PANEL: CPT | Performed by: EMERGENCY MEDICINE

## 2020-06-27 PROCEDURE — 85025 COMPLETE CBC W/AUTO DIFF WBC: CPT | Performed by: EMERGENCY MEDICINE

## 2020-06-27 PROCEDURE — 80307 DRUG TEST PRSMV CHEM ANLYZR: CPT | Performed by: EMERGENCY MEDICINE

## 2020-06-27 PROCEDURE — 83735 ASSAY OF MAGNESIUM: CPT | Performed by: EMERGENCY MEDICINE

## 2020-06-27 PROCEDURE — 99285 EMERGENCY DEPT VISIT HI MDM: CPT

## 2020-06-27 RX ORDER — SODIUM CHLORIDE 0.9 % (FLUSH) 0.9 %
10 SYRINGE (ML) INJECTION AS NEEDED
Status: DISCONTINUED | OUTPATIENT
Start: 2020-06-27 | End: 2020-07-07 | Stop reason: HOSPADM

## 2020-06-28 ENCOUNTER — APPOINTMENT (OUTPATIENT)
Dept: CT IMAGING | Facility: HOSPITAL | Age: 65
End: 2020-06-28

## 2020-06-28 ENCOUNTER — APPOINTMENT (OUTPATIENT)
Dept: GENERAL RADIOLOGY | Facility: HOSPITAL | Age: 65
End: 2020-06-28

## 2020-06-28 PROBLEM — F10.931 DTS (DELIRIUM TREMENS): Status: ACTIVE | Noted: 2020-06-28

## 2020-06-28 LAB
ALBUMIN SERPL-MCNC: 3.1 G/DL (ref 3.5–5.2)
ALBUMIN SERPL-MCNC: 3.7 G/DL (ref 3.5–5.2)
ALBUMIN/GLOB SERPL: 1.6 G/DL
ALBUMIN/GLOB SERPL: 1.9 G/DL
ALP SERPL-CCNC: 104 U/L (ref 39–117)
ALP SERPL-CCNC: 72 U/L (ref 39–117)
ALT SERPL W P-5'-P-CCNC: 71 U/L (ref 1–41)
ALT SERPL W P-5'-P-CCNC: 79 U/L (ref 1–41)
AMPHET+METHAMPHET UR QL: NEGATIVE
ANION GAP SERPL CALCULATED.3IONS-SCNC: 9.2 MMOL/L (ref 5–15)
ANION GAP SERPL CALCULATED.3IONS-SCNC: 9.9 MMOL/L (ref 5–15)
AST SERPL-CCNC: 118 U/L (ref 1–40)
AST SERPL-CCNC: 139 U/L (ref 1–40)
BARBITURATES UR QL SCN: NEGATIVE
BENZODIAZ UR QL SCN: NEGATIVE
BILIRUB SERPL-MCNC: 0.7 MG/DL (ref 0.2–1.2)
BILIRUB SERPL-MCNC: 0.7 MG/DL (ref 0.2–1.2)
BILIRUB UR QL STRIP: NEGATIVE
BUN BLD-MCNC: 6 MG/DL (ref 8–23)
BUN BLD-MCNC: 7 MG/DL (ref 8–23)
BUN/CREAT SERPL: 10 (ref 7–25)
BUN/CREAT SERPL: 9.4 (ref 7–25)
CALCIUM SPEC-SCNC: 8.5 MG/DL (ref 8.6–10.5)
CALCIUM SPEC-SCNC: 9.3 MG/DL (ref 8.6–10.5)
CANNABINOIDS SERPL QL: NEGATIVE
CHLORIDE SERPL-SCNC: 103 MMOL/L (ref 98–107)
CHLORIDE SERPL-SCNC: 99 MMOL/L (ref 98–107)
CK SERPL-CCNC: 199 U/L (ref 20–200)
CLARITY UR: CLEAR
CO2 SERPL-SCNC: 22.1 MMOL/L (ref 22–29)
CO2 SERPL-SCNC: 24.8 MMOL/L (ref 22–29)
COCAINE UR QL: NEGATIVE
COLOR UR: YELLOW
CREAT BLD-MCNC: 0.64 MG/DL (ref 0.76–1.27)
CREAT BLD-MCNC: 0.7 MG/DL (ref 0.76–1.27)
DEPRECATED RDW RBC AUTO: 41.4 FL (ref 37–54)
EOSINOPHIL # BLD MANUAL: 0.11 10*3/MM3 (ref 0–0.4)
EOSINOPHIL NFR BLD MANUAL: 3 % (ref 0.3–6.2)
ERYTHROCYTE [DISTWIDTH] IN BLOOD BY AUTOMATED COUNT: 11.3 % (ref 12.3–15.4)
ETHANOL BLD-MCNC: <10 MG/DL (ref 0–10)
ETHANOL UR QL: <0.01 %
GFR SERPL CREATININE-BSD FRML MDRD: 114 ML/MIN/1.73
GFR SERPL CREATININE-BSD FRML MDRD: 126 ML/MIN/1.73
GFR SERPL CREATININE-BSD FRML MDRD: 138 ML/MIN/1.73
GLOBULIN UR ELPH-MCNC: 2 GM/DL
GLOBULIN UR ELPH-MCNC: 2 GM/DL
GLUCOSE BLD-MCNC: 86 MG/DL (ref 65–99)
GLUCOSE BLD-MCNC: 86 MG/DL (ref 65–99)
GLUCOSE BLDC GLUCOMTR-MCNC: 79 MG/DL (ref 70–130)
GLUCOSE BLDC GLUCOMTR-MCNC: 80 MG/DL (ref 70–130)
GLUCOSE BLDC GLUCOMTR-MCNC: 84 MG/DL (ref 70–130)
GLUCOSE BLDC GLUCOMTR-MCNC: 97 MG/DL (ref 70–130)
GLUCOSE BLDC GLUCOMTR-MCNC: 99 MG/DL (ref 70–130)
GLUCOSE UR STRIP-MCNC: NEGATIVE MG/DL
HCT VFR BLD AUTO: 32.9 % (ref 37.5–51)
HGB BLD-MCNC: 11.7 G/DL (ref 13–17.7)
HGB UR QL STRIP.AUTO: NEGATIVE
KETONES UR QL STRIP: NEGATIVE
LEUKOCYTE ESTERASE UR QL STRIP.AUTO: NEGATIVE
LYMPHOCYTES # BLD MANUAL: 0.4 10*3/MM3 (ref 0.7–3.1)
LYMPHOCYTES NFR BLD MANUAL: 11 % (ref 19.6–45.3)
LYMPHOCYTES NFR BLD MANUAL: 8 % (ref 5–12)
MAGNESIUM SERPL-MCNC: 1.8 MG/DL (ref 1.6–2.4)
MAGNESIUM SERPL-MCNC: 2.2 MG/DL (ref 1.6–2.4)
MCH RBC QN AUTO: 35.8 PG (ref 26.6–33)
MCHC RBC AUTO-ENTMCNC: 35.6 G/DL (ref 31.5–35.7)
MCV RBC AUTO: 100.6 FL (ref 79–97)
METHADONE UR QL SCN: NEGATIVE
MONOCYTES # BLD AUTO: 0.29 10*3/MM3 (ref 0.1–0.9)
NEUTROPHILS # BLD AUTO: 2.82 10*3/MM3 (ref 1.7–7)
NEUTROPHILS NFR BLD MANUAL: 78 % (ref 42.7–76)
NITRITE UR QL STRIP: NEGATIVE
OPIATES UR QL: NEGATIVE
OXYCODONE UR QL SCN: NEGATIVE
PH UR STRIP.AUTO: 7.5 [PH] (ref 5–8)
PLAT MORPH BLD: NORMAL
PLATELET # BLD AUTO: 92 10*3/MM3 (ref 140–450)
PMV BLD AUTO: 10.1 FL (ref 6–12)
POTASSIUM BLD-SCNC: 3.2 MMOL/L (ref 3.5–5.2)
POTASSIUM BLD-SCNC: 3.4 MMOL/L (ref 3.5–5.2)
POTASSIUM BLD-SCNC: 3.6 MMOL/L (ref 3.5–5.2)
PROT SERPL-MCNC: 5.1 G/DL (ref 6–8.5)
PROT SERPL-MCNC: 5.7 G/DL (ref 6–8.5)
PROT UR QL STRIP: NEGATIVE
RBC # BLD AUTO: 3.27 10*6/MM3 (ref 4.14–5.8)
RBC MORPH BLD: NORMAL
SODIUM BLD-SCNC: 133 MMOL/L (ref 136–145)
SODIUM BLD-SCNC: 135 MMOL/L (ref 136–145)
SP GR UR STRIP: 1.01 (ref 1–1.03)
UROBILINOGEN UR QL STRIP: NORMAL
WBC MORPH BLD: NORMAL
WBC NRBC COR # BLD: 3.61 10*3/MM3 (ref 3.4–10.8)

## 2020-06-28 PROCEDURE — 80307 DRUG TEST PRSMV CHEM ANLYZR: CPT | Performed by: EMERGENCY MEDICINE

## 2020-06-28 PROCEDURE — 70450 CT HEAD/BRAIN W/O DYE: CPT

## 2020-06-28 PROCEDURE — 81003 URINALYSIS AUTO W/O SCOPE: CPT | Performed by: EMERGENCY MEDICINE

## 2020-06-28 PROCEDURE — 71046 X-RAY EXAM CHEST 2 VIEWS: CPT

## 2020-06-28 PROCEDURE — 85025 COMPLETE CBC W/AUTO DIFF WBC: CPT | Performed by: INTERNAL MEDICINE

## 2020-06-28 PROCEDURE — 25010000002 LORAZEPAM PER 2 MG: Performed by: INTERNAL MEDICINE

## 2020-06-28 PROCEDURE — 82962 GLUCOSE BLOOD TEST: CPT

## 2020-06-28 PROCEDURE — 25010000002 ENOXAPARIN PER 10 MG: Performed by: INTERNAL MEDICINE

## 2020-06-28 PROCEDURE — 25010000002 MAGNESIUM SULFATE PER 500 MG OF MAGNESIUM: Performed by: EMERGENCY MEDICINE

## 2020-06-28 PROCEDURE — 84132 ASSAY OF SERUM POTASSIUM: CPT | Performed by: INTERNAL MEDICINE

## 2020-06-28 PROCEDURE — 85007 BL SMEAR W/DIFF WBC COUNT: CPT | Performed by: INTERNAL MEDICINE

## 2020-06-28 PROCEDURE — 83735 ASSAY OF MAGNESIUM: CPT | Performed by: INTERNAL MEDICINE

## 2020-06-28 PROCEDURE — 80053 COMPREHEN METABOLIC PANEL: CPT | Performed by: INTERNAL MEDICINE

## 2020-06-28 PROCEDURE — 25010000002 LORAZEPAM PER 2 MG: Performed by: EMERGENCY MEDICINE

## 2020-06-28 PROCEDURE — 25010000002 THIAMINE PER 100 MG: Performed by: EMERGENCY MEDICINE

## 2020-06-28 PROCEDURE — 25010000003 POTASSIUM CHLORIDE 10 MEQ/100ML SOLUTION: Performed by: INTERNAL MEDICINE

## 2020-06-28 RX ORDER — LORAZEPAM 1 MG/1
2 TABLET ORAL
Status: DISCONTINUED | OUTPATIENT
Start: 2020-06-28 | End: 2020-07-07 | Stop reason: HOSPADM

## 2020-06-28 RX ORDER — POTASSIUM CHLORIDE 7.45 MG/ML
10 INJECTION INTRAVENOUS
Status: DISCONTINUED | OUTPATIENT
Start: 2020-06-28 | End: 2020-07-07 | Stop reason: HOSPADM

## 2020-06-28 RX ORDER — LORAZEPAM 2 MG/ML
2 INJECTION INTRAMUSCULAR ONCE
Status: DISCONTINUED | OUTPATIENT
Start: 2020-06-28 | End: 2020-06-28

## 2020-06-28 RX ORDER — LORAZEPAM 1 MG/1
1 TABLET ORAL
Status: DISCONTINUED | OUTPATIENT
Start: 2020-06-28 | End: 2020-07-07 | Stop reason: HOSPADM

## 2020-06-28 RX ORDER — LORAZEPAM 2 MG/ML
2 INJECTION INTRAMUSCULAR
Status: DISCONTINUED | OUTPATIENT
Start: 2020-06-28 | End: 2020-07-07 | Stop reason: HOSPADM

## 2020-06-28 RX ORDER — LORAZEPAM 2 MG/ML
2 INJECTION INTRAMUSCULAR ONCE
Status: COMPLETED | OUTPATIENT
Start: 2020-06-28 | End: 2020-06-28

## 2020-06-28 RX ORDER — NICOTINE POLACRILEX 4 MG
15 LOZENGE BUCCAL
Status: DISCONTINUED | OUTPATIENT
Start: 2020-06-28 | End: 2020-07-03

## 2020-06-28 RX ORDER — CHLORDIAZEPOXIDE HYDROCHLORIDE 25 MG/1
25 CAPSULE, GELATIN COATED ORAL EVERY 8 HOURS SCHEDULED
Status: DISCONTINUED | OUTPATIENT
Start: 2020-06-28 | End: 2020-06-29

## 2020-06-28 RX ORDER — FOLIC ACID 1 MG/1
1 TABLET ORAL DAILY
Status: COMPLETED | OUTPATIENT
Start: 2020-06-29 | End: 2020-07-01

## 2020-06-28 RX ORDER — MAGNESIUM SULFATE HEPTAHYDRATE 40 MG/ML
4 INJECTION, SOLUTION INTRAVENOUS AS NEEDED
Status: DISCONTINUED | OUTPATIENT
Start: 2020-06-28 | End: 2020-07-07 | Stop reason: HOSPADM

## 2020-06-28 RX ORDER — SODIUM CHLORIDE 0.9 % (FLUSH) 0.9 %
10 SYRINGE (ML) INJECTION AS NEEDED
Status: DISCONTINUED | OUTPATIENT
Start: 2020-06-28 | End: 2020-07-07 | Stop reason: HOSPADM

## 2020-06-28 RX ORDER — ACETAMINOPHEN 650 MG/1
650 SUPPOSITORY RECTAL EVERY 4 HOURS PRN
Status: DISCONTINUED | OUTPATIENT
Start: 2020-06-28 | End: 2020-07-07 | Stop reason: HOSPADM

## 2020-06-28 RX ORDER — IPRATROPIUM BROMIDE AND ALBUTEROL SULFATE 2.5; .5 MG/3ML; MG/3ML
3 SOLUTION RESPIRATORY (INHALATION)
Status: DISCONTINUED | OUTPATIENT
Start: 2020-06-28 | End: 2020-07-07 | Stop reason: HOSPADM

## 2020-06-28 RX ORDER — DIPHENOXYLATE HYDROCHLORIDE AND ATROPINE SULFATE 2.5; .025 MG/1; MG/1
1 TABLET ORAL DAILY
Status: COMPLETED | OUTPATIENT
Start: 2020-06-29 | End: 2020-07-01

## 2020-06-28 RX ORDER — ONDANSETRON 2 MG/ML
4 INJECTION INTRAMUSCULAR; INTRAVENOUS EVERY 6 HOURS PRN
Status: DISCONTINUED | OUTPATIENT
Start: 2020-06-28 | End: 2020-07-07 | Stop reason: HOSPADM

## 2020-06-28 RX ORDER — LORAZEPAM 2 MG/ML
1 INJECTION INTRAMUSCULAR ONCE
Status: DISCONTINUED | OUTPATIENT
Start: 2020-06-28 | End: 2020-06-28

## 2020-06-28 RX ORDER — ONDANSETRON 4 MG/1
4 TABLET, FILM COATED ORAL EVERY 6 HOURS PRN
Status: DISCONTINUED | OUTPATIENT
Start: 2020-06-28 | End: 2020-07-07 | Stop reason: HOSPADM

## 2020-06-28 RX ORDER — PANTOPRAZOLE SODIUM 40 MG/1
40 TABLET, DELAYED RELEASE ORAL
Status: DISCONTINUED | OUTPATIENT
Start: 2020-06-29 | End: 2020-07-07 | Stop reason: HOSPADM

## 2020-06-28 RX ORDER — ACETAMINOPHEN 325 MG/1
650 TABLET ORAL EVERY 4 HOURS PRN
Status: DISCONTINUED | OUTPATIENT
Start: 2020-06-28 | End: 2020-07-07 | Stop reason: HOSPADM

## 2020-06-28 RX ORDER — DEXTROSE MONOHYDRATE 25 G/50ML
25 INJECTION, SOLUTION INTRAVENOUS
Status: DISCONTINUED | OUTPATIENT
Start: 2020-06-28 | End: 2020-07-03

## 2020-06-28 RX ORDER — LORAZEPAM 2 MG/ML
4 INJECTION INTRAMUSCULAR
Status: DISCONTINUED | OUTPATIENT
Start: 2020-06-28 | End: 2020-07-07 | Stop reason: HOSPADM

## 2020-06-28 RX ORDER — SODIUM CHLORIDE 0.9 % (FLUSH) 0.9 %
10 SYRINGE (ML) INJECTION EVERY 12 HOURS SCHEDULED
Status: DISCONTINUED | OUTPATIENT
Start: 2020-06-28 | End: 2020-07-07 | Stop reason: HOSPADM

## 2020-06-28 RX ORDER — SODIUM CHLORIDE 9 MG/ML
100 INJECTION, SOLUTION INTRAVENOUS CONTINUOUS
Status: ACTIVE | OUTPATIENT
Start: 2020-06-28 | End: 2020-06-28

## 2020-06-28 RX ORDER — THIAMINE MONONITRATE (VIT B1) 100 MG
100 TABLET ORAL DAILY
Status: COMPLETED | OUTPATIENT
Start: 2020-06-29 | End: 2020-07-01

## 2020-06-28 RX ORDER — ARFORMOTEROL TARTRATE 15 UG/2ML
15 SOLUTION RESPIRATORY (INHALATION)
Status: DISCONTINUED | OUTPATIENT
Start: 2020-06-28 | End: 2020-07-07 | Stop reason: HOSPADM

## 2020-06-28 RX ORDER — MAGNESIUM SULFATE HEPTAHYDRATE 40 MG/ML
2 INJECTION, SOLUTION INTRAVENOUS AS NEEDED
Status: DISCONTINUED | OUTPATIENT
Start: 2020-06-28 | End: 2020-07-07 | Stop reason: HOSPADM

## 2020-06-28 RX ORDER — LORAZEPAM 2 MG/ML
1 INJECTION INTRAMUSCULAR
Status: DISCONTINUED | OUTPATIENT
Start: 2020-06-28 | End: 2020-07-07 | Stop reason: HOSPADM

## 2020-06-28 RX ORDER — DEXMEDETOMIDINE HYDROCHLORIDE 4 UG/ML
.2-1.5 INJECTION, SOLUTION INTRAVENOUS
Status: DISCONTINUED | OUTPATIENT
Start: 2020-06-28 | End: 2020-06-28

## 2020-06-28 RX ORDER — LORAZEPAM 1 MG/1
4 TABLET ORAL
Status: DISCONTINUED | OUTPATIENT
Start: 2020-06-28 | End: 2020-07-07 | Stop reason: HOSPADM

## 2020-06-28 RX ORDER — OLANZAPINE 10 MG/1
10 INJECTION, POWDER, LYOPHILIZED, FOR SOLUTION INTRAMUSCULAR ONCE
Status: COMPLETED | OUTPATIENT
Start: 2020-06-28 | End: 2020-06-28

## 2020-06-28 RX ADMIN — LORAZEPAM 2 MG: 2 INJECTION INTRAMUSCULAR; INTRAVENOUS at 17:15

## 2020-06-28 RX ADMIN — POTASSIUM CHLORIDE 10 MEQ: 7.46 INJECTION, SOLUTION INTRAVENOUS at 09:37

## 2020-06-28 RX ADMIN — POTASSIUM CHLORIDE 10 MEQ: 7.46 INJECTION, SOLUTION INTRAVENOUS at 07:08

## 2020-06-28 RX ADMIN — OLANZAPINE 10 MG: 10 INJECTION, POWDER, LYOPHILIZED, FOR SOLUTION INTRAMUSCULAR at 00:13

## 2020-06-28 RX ADMIN — LORAZEPAM 1 MG: 1 TABLET ORAL at 13:52

## 2020-06-28 RX ADMIN — LORAZEPAM 2 MG: 2 INJECTION INTRAMUSCULAR; INTRAVENOUS at 17:47

## 2020-06-28 RX ADMIN — POTASSIUM CHLORIDE 10 MEQ: 7.46 INJECTION, SOLUTION INTRAVENOUS at 04:43

## 2020-06-28 RX ADMIN — LORAZEPAM 2 MG: 2 INJECTION INTRAMUSCULAR; INTRAVENOUS at 20:07

## 2020-06-28 RX ADMIN — LORAZEPAM 2 MG: 2 INJECTION INTRAMUSCULAR; INTRAVENOUS at 00:50

## 2020-06-28 RX ADMIN — ENOXAPARIN SODIUM 40 MG: 40 INJECTION SUBCUTANEOUS at 07:09

## 2020-06-28 RX ADMIN — LORAZEPAM 2 MG: 2 INJECTION INTRAMUSCULAR; INTRAVENOUS at 18:10

## 2020-06-28 RX ADMIN — POTASSIUM CHLORIDE 10 MEQ: 7.46 INJECTION, SOLUTION INTRAVENOUS at 06:00

## 2020-06-28 RX ADMIN — THIAMINE HYDROCHLORIDE 1000 ML/HR: 100 INJECTION, SOLUTION INTRAMUSCULAR; INTRAVENOUS at 00:15

## 2020-06-28 RX ADMIN — SODIUM CHLORIDE, PRESERVATIVE FREE 10 ML: 5 INJECTION INTRAVENOUS at 20:15

## 2020-06-28 RX ADMIN — SODIUM CHLORIDE 100 ML/HR: 9 INJECTION, SOLUTION INTRAVENOUS at 03:12

## 2020-06-28 RX ADMIN — CHLORDIAZEPOXIDE HYDROCHLORIDE 25 MG: 25 CAPSULE ORAL at 14:31

## 2020-06-28 RX ADMIN — CHLORDIAZEPOXIDE HYDROCHLORIDE 25 MG: 25 CAPSULE ORAL at 21:07

## 2020-06-28 RX ADMIN — LORAZEPAM 2 MG: 2 INJECTION INTRAMUSCULAR; INTRAVENOUS at 21:07

## 2020-06-28 RX ADMIN — LORAZEPAM 2 MG: 2 INJECTION INTRAMUSCULAR; INTRAVENOUS at 01:24

## 2020-06-28 RX ADMIN — SODIUM CHLORIDE 0.6 MCG/KG/HR: 9 INJECTION, SOLUTION INTRAVENOUS at 01:42

## 2020-06-28 RX ADMIN — SODIUM CHLORIDE 100 ML/HR: 9 INJECTION, SOLUTION INTRAVENOUS at 11:07

## 2020-06-28 RX ADMIN — LORAZEPAM 2 MG: 2 INJECTION INTRAMUSCULAR; INTRAVENOUS at 17:31

## 2020-06-29 LAB
ANION GAP SERPL CALCULATED.3IONS-SCNC: 10 MMOL/L (ref 5–15)
BUN BLD-MCNC: 4 MG/DL (ref 8–23)
BUN/CREAT SERPL: 7.7 (ref 7–25)
CALCIUM SPEC-SCNC: 7.7 MG/DL (ref 8.6–10.5)
CHLORIDE SERPL-SCNC: 101 MMOL/L (ref 98–107)
CO2 SERPL-SCNC: 20 MMOL/L (ref 22–29)
CREAT BLD-MCNC: 0.52 MG/DL (ref 0.76–1.27)
DEPRECATED RDW RBC AUTO: 41.3 FL (ref 37–54)
ERYTHROCYTE [DISTWIDTH] IN BLOOD BY AUTOMATED COUNT: 11.1 % (ref 12.3–15.4)
GFR SERPL CREATININE-BSD FRML MDRD: >150 ML/MIN/1.73
GLUCOSE BLD-MCNC: 68 MG/DL (ref 65–99)
GLUCOSE BLDC GLUCOMTR-MCNC: 72 MG/DL (ref 70–130)
GLUCOSE BLDC GLUCOMTR-MCNC: 75 MG/DL (ref 70–130)
GLUCOSE BLDC GLUCOMTR-MCNC: 76 MG/DL (ref 70–130)
GLUCOSE BLDC GLUCOMTR-MCNC: 76 MG/DL (ref 70–130)
GLUCOSE BLDC GLUCOMTR-MCNC: 83 MG/DL (ref 70–130)
HCT VFR BLD AUTO: 35.3 % (ref 37.5–51)
HGB BLD-MCNC: 12.5 G/DL (ref 13–17.7)
IRON 24H UR-MRATE: 22 MCG/DL (ref 59–158)
IRON SATN MFR SERPL: 11 % (ref 20–50)
MAGNESIUM SERPL-MCNC: 1.9 MG/DL (ref 1.6–2.4)
MCH RBC QN AUTO: 35.8 PG (ref 26.6–33)
MCHC RBC AUTO-ENTMCNC: 35.4 G/DL (ref 31.5–35.7)
MCV RBC AUTO: 101.1 FL (ref 79–97)
PHOSPHATE SERPL-MCNC: 2.4 MG/DL (ref 2.5–4.5)
PLATELET # BLD AUTO: 86 10*3/MM3 (ref 140–450)
PMV BLD AUTO: 10.1 FL (ref 6–12)
POTASSIUM BLD-SCNC: 3.6 MMOL/L (ref 3.5–5.2)
RBC # BLD AUTO: 3.49 10*6/MM3 (ref 4.14–5.8)
SODIUM BLD-SCNC: 131 MMOL/L (ref 136–145)
TIBC SERPL-MCNC: 209 MCG/DL (ref 298–536)
TRANSFERRIN SERPL-MCNC: 140 MG/DL (ref 200–360)
WBC NRBC COR # BLD: 4.77 10*3/MM3 (ref 3.4–10.8)

## 2020-06-29 PROCEDURE — 94799 UNLISTED PULMONARY SVC/PX: CPT

## 2020-06-29 PROCEDURE — 84466 ASSAY OF TRANSFERRIN: CPT | Performed by: INTERNAL MEDICINE

## 2020-06-29 PROCEDURE — 25010000002 LORAZEPAM PER 2 MG: Performed by: INTERNAL MEDICINE

## 2020-06-29 PROCEDURE — 25010000003 POTASSIUM CHLORIDE 10 MEQ/100ML SOLUTION: Performed by: INTERNAL MEDICINE

## 2020-06-29 PROCEDURE — 82962 GLUCOSE BLOOD TEST: CPT

## 2020-06-29 PROCEDURE — 94640 AIRWAY INHALATION TREATMENT: CPT

## 2020-06-29 PROCEDURE — 84100 ASSAY OF PHOSPHORUS: CPT | Performed by: INTERNAL MEDICINE

## 2020-06-29 PROCEDURE — 85027 COMPLETE CBC AUTOMATED: CPT | Performed by: INTERNAL MEDICINE

## 2020-06-29 PROCEDURE — 80048 BASIC METABOLIC PNL TOTAL CA: CPT | Performed by: INTERNAL MEDICINE

## 2020-06-29 PROCEDURE — 83540 ASSAY OF IRON: CPT | Performed by: INTERNAL MEDICINE

## 2020-06-29 PROCEDURE — 83735 ASSAY OF MAGNESIUM: CPT | Performed by: INTERNAL MEDICINE

## 2020-06-29 PROCEDURE — 25010000002 ENOXAPARIN PER 10 MG: Performed by: INTERNAL MEDICINE

## 2020-06-29 RX ORDER — FERROUS SULFATE 325(65) MG
325 TABLET ORAL 2 TIMES DAILY WITH MEALS
Status: DISCONTINUED | OUTPATIENT
Start: 2020-06-29 | End: 2020-07-07 | Stop reason: HOSPADM

## 2020-06-29 RX ORDER — SODIUM CHLORIDE 9 MG/ML
100 INJECTION, SOLUTION INTRAVENOUS CONTINUOUS
Status: DISCONTINUED | OUTPATIENT
Start: 2020-06-29 | End: 2020-07-01

## 2020-06-29 RX ORDER — CLONIDINE HYDROCHLORIDE 0.1 MG/1
0.1 TABLET ORAL EVERY 8 HOURS SCHEDULED
Status: DISCONTINUED | OUTPATIENT
Start: 2020-06-29 | End: 2020-07-07 | Stop reason: HOSPADM

## 2020-06-29 RX ORDER — CHLORDIAZEPOXIDE HYDROCHLORIDE 25 MG/1
25 CAPSULE, GELATIN COATED ORAL EVERY 6 HOURS SCHEDULED
Status: DISCONTINUED | OUTPATIENT
Start: 2020-06-29 | End: 2020-07-02

## 2020-06-29 RX ADMIN — LORAZEPAM 2 MG: 2 INJECTION INTRAMUSCULAR; INTRAVENOUS at 12:16

## 2020-06-29 RX ADMIN — FOLIC ACID 1 MG: 1 TABLET ORAL at 10:29

## 2020-06-29 RX ADMIN — CHLORDIAZEPOXIDE HYDROCHLORIDE 25 MG: 25 CAPSULE ORAL at 11:33

## 2020-06-29 RX ADMIN — CHLORDIAZEPOXIDE HYDROCHLORIDE 25 MG: 25 CAPSULE ORAL at 23:02

## 2020-06-29 RX ADMIN — POTASSIUM PHOSPHATE, MONOBASIC AND POTASSIUM PHOSPHATE, DIBASIC 15 MMOL: 224; 236 INJECTION, SOLUTION, CONCENTRATE INTRAVENOUS at 11:33

## 2020-06-29 RX ADMIN — LORAZEPAM 2 MG: 2 INJECTION INTRAMUSCULAR; INTRAVENOUS at 01:27

## 2020-06-29 RX ADMIN — Medication 1 TABLET: at 10:30

## 2020-06-29 RX ADMIN — LORAZEPAM 2 MG: 2 INJECTION INTRAMUSCULAR; INTRAVENOUS at 08:05

## 2020-06-29 RX ADMIN — LORAZEPAM 2 MG: 2 INJECTION INTRAMUSCULAR; INTRAVENOUS at 04:11

## 2020-06-29 RX ADMIN — POTASSIUM CHLORIDE 10 MEQ: 7.46 INJECTION, SOLUTION INTRAVENOUS at 13:57

## 2020-06-29 RX ADMIN — IPRATROPIUM BROMIDE AND ALBUTEROL SULFATE 3 ML: .5; 3 SOLUTION RESPIRATORY (INHALATION) at 20:08

## 2020-06-29 RX ADMIN — SODIUM CHLORIDE, PRESERVATIVE FREE 10 ML: 5 INJECTION INTRAVENOUS at 16:11

## 2020-06-29 RX ADMIN — LORAZEPAM 2 MG: 2 INJECTION INTRAMUSCULAR; INTRAVENOUS at 05:44

## 2020-06-29 RX ADMIN — LORAZEPAM 2 MG: 2 INJECTION INTRAMUSCULAR; INTRAVENOUS at 02:35

## 2020-06-29 RX ADMIN — POTASSIUM CHLORIDE 10 MEQ: 7.46 INJECTION, SOLUTION INTRAVENOUS at 12:45

## 2020-06-29 RX ADMIN — ARFORMOTEROL TARTRATE 15 MCG: 15 SOLUTION RESPIRATORY (INHALATION) at 08:33

## 2020-06-29 RX ADMIN — LORAZEPAM 2 MG: 2 INJECTION INTRAMUSCULAR; INTRAVENOUS at 10:15

## 2020-06-29 RX ADMIN — LORAZEPAM 2 MG: 2 INJECTION INTRAMUSCULAR; INTRAVENOUS at 10:30

## 2020-06-29 RX ADMIN — PANTOPRAZOLE SODIUM 40 MG: 40 TABLET, DELAYED RELEASE ORAL at 05:44

## 2020-06-29 RX ADMIN — CHLORDIAZEPOXIDE HYDROCHLORIDE 25 MG: 25 CAPSULE ORAL at 05:44

## 2020-06-29 RX ADMIN — POTASSIUM CHLORIDE 10 MEQ: 7.46 INJECTION, SOLUTION INTRAVENOUS at 15:03

## 2020-06-29 RX ADMIN — LORAZEPAM 2 MG: 2 INJECTION INTRAMUSCULAR; INTRAVENOUS at 07:15

## 2020-06-29 RX ADMIN — DEXTROSE MONOHYDRATE 25 G: 500 INJECTION PARENTERAL at 11:33

## 2020-06-29 RX ADMIN — Medication 100 MG: at 10:29

## 2020-06-29 RX ADMIN — LORAZEPAM 2 MG: 2 INJECTION INTRAMUSCULAR; INTRAVENOUS at 09:10

## 2020-06-29 RX ADMIN — SODIUM CHLORIDE, PRESERVATIVE FREE 10 ML: 5 INJECTION INTRAVENOUS at 20:38

## 2020-06-29 RX ADMIN — POTASSIUM CHLORIDE 10 MEQ: 7.46 INJECTION, SOLUTION INTRAVENOUS at 11:33

## 2020-06-29 RX ADMIN — LORAZEPAM 2 MG: 2 INJECTION INTRAMUSCULAR; INTRAVENOUS at 22:46

## 2020-06-29 RX ADMIN — FERROUS SULFATE TAB 325 MG (65 MG ELEMENTAL FE) 325 MG: 325 (65 FE) TAB at 20:33

## 2020-06-29 RX ADMIN — CLONIDINE HYDROCHLORIDE 0.1 MG: 0.1 TABLET ORAL at 22:19

## 2020-06-29 RX ADMIN — ENOXAPARIN SODIUM 40 MG: 40 INJECTION SUBCUTANEOUS at 05:44

## 2020-06-30 LAB
ALBUMIN SERPL-MCNC: 3.5 G/DL (ref 3.5–5.2)
ANION GAP SERPL CALCULATED.3IONS-SCNC: 17.7 MMOL/L (ref 5–15)
BUN SERPL-MCNC: 6 MG/DL (ref 8–23)
BUN/CREAT SERPL: 8.2 (ref 7–25)
CALCIUM SPEC-SCNC: 8.7 MG/DL (ref 8.6–10.5)
CHLORIDE SERPL-SCNC: 99 MMOL/L (ref 98–107)
CO2 SERPL-SCNC: 18.3 MMOL/L (ref 22–29)
CREAT SERPL-MCNC: 0.73 MG/DL (ref 0.76–1.27)
DEPRECATED RDW RBC AUTO: 41.2 FL (ref 37–54)
ERYTHROCYTE [DISTWIDTH] IN BLOOD BY AUTOMATED COUNT: 11.3 % (ref 12.3–15.4)
GFR SERPL CREATININE-BSD FRML MDRD: 108 ML/MIN/1.73
GLUCOSE BLDC GLUCOMTR-MCNC: 73 MG/DL (ref 70–130)
GLUCOSE BLDC GLUCOMTR-MCNC: 82 MG/DL (ref 70–130)
GLUCOSE BLDC GLUCOMTR-MCNC: 84 MG/DL (ref 70–130)
GLUCOSE SERPL-MCNC: 65 MG/DL (ref 65–99)
HCT VFR BLD AUTO: 39.9 % (ref 37.5–51)
HGB BLD-MCNC: 14.1 G/DL (ref 13–17.7)
MCH RBC QN AUTO: 35.5 PG (ref 26.6–33)
MCHC RBC AUTO-ENTMCNC: 35.3 G/DL (ref 31.5–35.7)
MCV RBC AUTO: 100.5 FL (ref 79–97)
PHOSPHATE SERPL-MCNC: 2.5 MG/DL (ref 2.5–4.5)
PLATELET # BLD AUTO: 127 10*3/MM3 (ref 140–450)
PMV BLD AUTO: 10.2 FL (ref 6–12)
POTASSIUM SERPL-SCNC: 4 MMOL/L (ref 3.5–5.2)
RBC # BLD AUTO: 3.97 10*6/MM3 (ref 4.14–5.8)
SODIUM SERPL-SCNC: 135 MMOL/L (ref 136–145)
WBC # BLD AUTO: 6.12 10*3/MM3 (ref 3.4–10.8)

## 2020-06-30 PROCEDURE — 85027 COMPLETE CBC AUTOMATED: CPT | Performed by: INTERNAL MEDICINE

## 2020-06-30 PROCEDURE — 82962 GLUCOSE BLOOD TEST: CPT

## 2020-06-30 PROCEDURE — 25010000002 ENOXAPARIN PER 10 MG: Performed by: INTERNAL MEDICINE

## 2020-06-30 PROCEDURE — 25010000002 ONDANSETRON PER 1 MG: Performed by: INTERNAL MEDICINE

## 2020-06-30 PROCEDURE — 94799 UNLISTED PULMONARY SVC/PX: CPT

## 2020-06-30 PROCEDURE — 80069 RENAL FUNCTION PANEL: CPT | Performed by: INTERNAL MEDICINE

## 2020-06-30 PROCEDURE — 25010000002 LORAZEPAM PER 2 MG: Performed by: INTERNAL MEDICINE

## 2020-06-30 PROCEDURE — 94640 AIRWAY INHALATION TREATMENT: CPT

## 2020-06-30 RX ADMIN — CHLORDIAZEPOXIDE HYDROCHLORIDE 25 MG: 25 CAPSULE ORAL at 06:20

## 2020-06-30 RX ADMIN — ONDANSETRON 4 MG: 2 INJECTION INTRAMUSCULAR; INTRAVENOUS at 08:58

## 2020-06-30 RX ADMIN — LORAZEPAM 2 MG: 2 INJECTION INTRAMUSCULAR; INTRAVENOUS at 00:53

## 2020-06-30 RX ADMIN — ARFORMOTEROL TARTRATE 15 MCG: 15 SOLUTION RESPIRATORY (INHALATION) at 07:12

## 2020-06-30 RX ADMIN — FOLIC ACID 1 MG: 1 TABLET ORAL at 08:49

## 2020-06-30 RX ADMIN — ENOXAPARIN SODIUM 40 MG: 40 INJECTION SUBCUTANEOUS at 06:20

## 2020-06-30 RX ADMIN — CLONIDINE HYDROCHLORIDE 0.1 MG: 0.1 TABLET ORAL at 15:45

## 2020-06-30 RX ADMIN — CHLORDIAZEPOXIDE HYDROCHLORIDE 25 MG: 25 CAPSULE ORAL at 19:48

## 2020-06-30 RX ADMIN — SODIUM CHLORIDE, PRESERVATIVE FREE 10 ML: 5 INJECTION INTRAVENOUS at 22:09

## 2020-06-30 RX ADMIN — LORAZEPAM 1 MG: 1 TABLET ORAL at 15:44

## 2020-06-30 RX ADMIN — FERROUS SULFATE TAB 325 MG (65 MG ELEMENTAL FE) 325 MG: 325 (65 FE) TAB at 08:49

## 2020-06-30 RX ADMIN — CHLORDIAZEPOXIDE HYDROCHLORIDE 25 MG: 25 CAPSULE ORAL at 12:33

## 2020-06-30 RX ADMIN — Medication 100 MG: at 15:44

## 2020-06-30 RX ADMIN — CLONIDINE HYDROCHLORIDE 0.1 MG: 0.1 TABLET ORAL at 06:20

## 2020-06-30 RX ADMIN — ARFORMOTEROL TARTRATE 15 MCG: 15 SOLUTION RESPIRATORY (INHALATION) at 20:02

## 2020-06-30 RX ADMIN — SODIUM CHLORIDE, PRESERVATIVE FREE 10 ML: 5 INJECTION INTRAVENOUS at 10:10

## 2020-06-30 RX ADMIN — PANTOPRAZOLE SODIUM 40 MG: 40 TABLET, DELAYED RELEASE ORAL at 06:20

## 2020-06-30 RX ADMIN — LORAZEPAM 2 MG: 2 INJECTION INTRAMUSCULAR; INTRAVENOUS at 12:29

## 2020-06-30 RX ADMIN — CHLORDIAZEPOXIDE HYDROCHLORIDE 25 MG: 25 CAPSULE ORAL at 23:50

## 2020-06-30 RX ADMIN — Medication 1 TABLET: at 08:49

## 2020-06-30 RX ADMIN — SODIUM CHLORIDE 100 ML/HR: 9 INJECTION, SOLUTION INTRAVENOUS at 13:34

## 2020-06-30 RX ADMIN — CLONIDINE HYDROCHLORIDE 0.1 MG: 0.1 TABLET ORAL at 23:49

## 2020-06-30 RX ADMIN — LORAZEPAM 2 MG: 2 INJECTION INTRAMUSCULAR; INTRAVENOUS at 22:06

## 2020-06-30 RX ADMIN — SODIUM CHLORIDE 100 ML/HR: 9 INJECTION, SOLUTION INTRAVENOUS at 03:30

## 2020-06-30 RX ADMIN — FERROUS SULFATE TAB 325 MG (65 MG ELEMENTAL FE) 325 MG: 325 (65 FE) TAB at 19:49

## 2020-07-01 LAB
ALBUMIN SERPL-MCNC: 3.3 G/DL (ref 3.5–5.2)
ANION GAP SERPL CALCULATED.3IONS-SCNC: 12 MMOL/L (ref 5–15)
BUN SERPL-MCNC: 6 MG/DL (ref 8–23)
BUN/CREAT SERPL: 9.8 (ref 7–25)
CALCIUM SPEC-SCNC: 8.3 MG/DL (ref 8.6–10.5)
CHLORIDE SERPL-SCNC: 97 MMOL/L (ref 98–107)
CO2 SERPL-SCNC: 20 MMOL/L (ref 22–29)
CREAT SERPL-MCNC: 0.61 MG/DL (ref 0.76–1.27)
DEPRECATED RDW RBC AUTO: 41.3 FL (ref 37–54)
ERYTHROCYTE [DISTWIDTH] IN BLOOD BY AUTOMATED COUNT: 11.4 % (ref 12.3–15.4)
GFR SERPL CREATININE-BSD FRML MDRD: 133 ML/MIN/1.73
GLUCOSE BLDC GLUCOMTR-MCNC: 114 MG/DL (ref 70–130)
GLUCOSE BLDC GLUCOMTR-MCNC: 67 MG/DL (ref 70–130)
GLUCOSE BLDC GLUCOMTR-MCNC: 76 MG/DL (ref 70–130)
GLUCOSE BLDC GLUCOMTR-MCNC: 84 MG/DL (ref 70–130)
GLUCOSE SERPL-MCNC: 64 MG/DL (ref 65–99)
HCT VFR BLD AUTO: 36.6 % (ref 37.5–51)
HGB BLD-MCNC: 13 G/DL (ref 13–17.7)
MCH RBC QN AUTO: 35.4 PG (ref 26.6–33)
MCHC RBC AUTO-ENTMCNC: 35.5 G/DL (ref 31.5–35.7)
MCV RBC AUTO: 99.7 FL (ref 79–97)
PHOSPHATE SERPL-MCNC: 2.5 MG/DL (ref 2.5–4.5)
PLATELET # BLD AUTO: 132 10*3/MM3 (ref 140–450)
PMV BLD AUTO: 10.1 FL (ref 6–12)
POTASSIUM SERPL-SCNC: 3.5 MMOL/L (ref 3.5–5.2)
RBC # BLD AUTO: 3.67 10*6/MM3 (ref 4.14–5.8)
SODIUM SERPL-SCNC: 129 MMOL/L (ref 136–145)
WBC # BLD AUTO: 5.51 10*3/MM3 (ref 3.4–10.8)

## 2020-07-01 PROCEDURE — 94799 UNLISTED PULMONARY SVC/PX: CPT

## 2020-07-01 PROCEDURE — 80069 RENAL FUNCTION PANEL: CPT | Performed by: INTERNAL MEDICINE

## 2020-07-01 PROCEDURE — 82962 GLUCOSE BLOOD TEST: CPT

## 2020-07-01 PROCEDURE — 92610 EVALUATE SWALLOWING FUNCTION: CPT | Performed by: SPEECH-LANGUAGE PATHOLOGIST

## 2020-07-01 PROCEDURE — 25010000002 ENOXAPARIN PER 10 MG: Performed by: INTERNAL MEDICINE

## 2020-07-01 PROCEDURE — 25010000002 LORAZEPAM PER 2 MG: Performed by: INTERNAL MEDICINE

## 2020-07-01 PROCEDURE — 85027 COMPLETE CBC AUTOMATED: CPT | Performed by: INTERNAL MEDICINE

## 2020-07-01 RX ORDER — DEXTROSE AND SODIUM CHLORIDE 5; .9 G/100ML; G/100ML
50 INJECTION, SOLUTION INTRAVENOUS CONTINUOUS
Status: DISCONTINUED | OUTPATIENT
Start: 2020-07-01 | End: 2020-07-03

## 2020-07-01 RX ADMIN — LORAZEPAM 2 MG: 2 INJECTION INTRAMUSCULAR; INTRAVENOUS at 00:27

## 2020-07-01 RX ADMIN — CHLORDIAZEPOXIDE HYDROCHLORIDE 25 MG: 25 CAPSULE ORAL at 17:16

## 2020-07-01 RX ADMIN — CHLORDIAZEPOXIDE HYDROCHLORIDE 25 MG: 25 CAPSULE ORAL at 23:20

## 2020-07-01 RX ADMIN — FERROUS SULFATE TAB 325 MG (65 MG ELEMENTAL FE) 325 MG: 325 (65 FE) TAB at 08:14

## 2020-07-01 RX ADMIN — Medication 1 TABLET: at 08:14

## 2020-07-01 RX ADMIN — FERROUS SULFATE TAB 325 MG (65 MG ELEMENTAL FE) 325 MG: 325 (65 FE) TAB at 17:16

## 2020-07-01 RX ADMIN — ARFORMOTEROL TARTRATE 15 MCG: 15 SOLUTION RESPIRATORY (INHALATION) at 19:13

## 2020-07-01 RX ADMIN — SODIUM CHLORIDE 100 ML/HR: 9 INJECTION, SOLUTION INTRAVENOUS at 08:58

## 2020-07-01 RX ADMIN — LORAZEPAM 2 MG: 2 INJECTION INTRAMUSCULAR; INTRAVENOUS at 10:10

## 2020-07-01 RX ADMIN — CLONIDINE HYDROCHLORIDE 0.1 MG: 0.1 TABLET ORAL at 20:30

## 2020-07-01 RX ADMIN — ENOXAPARIN SODIUM 40 MG: 40 INJECTION SUBCUTANEOUS at 07:02

## 2020-07-01 RX ADMIN — CLONIDINE HYDROCHLORIDE 0.1 MG: 0.1 TABLET ORAL at 06:37

## 2020-07-01 RX ADMIN — SODIUM CHLORIDE 100 ML/HR: 9 INJECTION, SOLUTION INTRAVENOUS at 02:22

## 2020-07-01 RX ADMIN — DEXTROSE AND SODIUM CHLORIDE 50 ML/HR: 5; 900 INJECTION, SOLUTION INTRAVENOUS at 14:06

## 2020-07-01 RX ADMIN — FOLIC ACID 1 MG: 1 TABLET ORAL at 08:14

## 2020-07-01 RX ADMIN — LORAZEPAM 2 MG: 2 INJECTION INTRAMUSCULAR; INTRAVENOUS at 10:27

## 2020-07-01 RX ADMIN — CHLORDIAZEPOXIDE HYDROCHLORIDE 25 MG: 25 CAPSULE ORAL at 06:40

## 2020-07-01 RX ADMIN — LORAZEPAM 1 MG: 1 TABLET ORAL at 07:15

## 2020-07-01 RX ADMIN — Medication 100 MG: at 08:14

## 2020-07-01 RX ADMIN — SODIUM CHLORIDE, PRESERVATIVE FREE 10 ML: 5 INJECTION INTRAVENOUS at 08:14

## 2020-07-01 RX ADMIN — ARFORMOTEROL TARTRATE 15 MCG: 15 SOLUTION RESPIRATORY (INHALATION) at 07:07

## 2020-07-01 RX ADMIN — SODIUM CHLORIDE, PRESERVATIVE FREE 10 ML: 5 INJECTION INTRAVENOUS at 20:18

## 2020-07-01 NOTE — THERAPY EVALUATION
Acute Care - Speech Language Pathology   Swallow Initial Evaluation [facility]     Patient Name: Albert Tellez  : 1955  MRN: [id number]  Today's Date: 2020               Admit Date: 2020    Visit Dx:     ICD-10-CM ICD-9-CM   1. DTs (delirium tremens) (CMS/[facility]ly [facility]) F10.231 291.0     Patient Active Problem List   Diagnosis   • Spinal stenosis of lumbar region with neurogenic claudication   • Lumbar spinal stenosis   • DTs (delirium tremens) (CMS/[facility]ly [facility])     Past Medical History:   Diagnosis Date   • Hearing loss     JEREMI HEARING AIDS   • History of melanoma     REMOVED FROM RIGHT TEMPLE   • Hypertension     IN THE PAST. RESOLVED. NO MEDS CURRENTLY   • Rosacea    • Skin cancer     MELANOMA REMOVED FROM RIGHT TEMPLE   • Spinal stenosis, lumbar     L3-4, L4-5     Past Surgical History:   Procedure Laterality Date   • ARM LACERATION REPAIR Left    • LUMBAR LAMINECTOMY DISCECTOMY DECOMPRESSION N/A 2018    Procedure: L3-4,4-5 laminectomy;  Surgeon: Shane Ridley MD;  Location: [facility] MAIN OR;  Service: Orthopedic Spine   • TONSILLECTOMY     • TOTAL HIP ARTHROPLASTY Left 2012   • WISDOM TOOTH EXTRACTION          SWALLOW EVALUATION (last 72 hours)      SLP Adult Swallow Evaluation     Row Name 20 0900                   Rehab Evaluation    Document Type  evaluation  -JJ        Patient Observations  poorly cooperative  -JJ        Patient/Family Observations  Patient in wrist restraints, essentially refused all trials, extrememly confused  -JJ        Patient Effort  poor  -JJ        Symptoms Noted During/After Treatment  other (see comments) confusion  -JJ           General Information    Patient Profile Reviewed  yes  -JJ        Pertinent History Of Current Problem  admitted with alcohol withrawal delirium, acute bronchitis, chest xray clear per MD note  -JJ        Current Method of Nutrition  clear liquids;other (see comments) Patient has been refusing PO  -JJ         "Precautions/Limitations, Vision  WFL;for purposes of eval  -JJ        Precautions/Limitations, Hearing  WFL;for purposes of eval  -JJ        Prior Level of Function-Communication  other (see comments) baseline unknown  -JJ        Prior Level of Function-Swallowing  no diet consistency restrictions  -JJ        Plans/Goals Discussed with  patient  -JJ        Barriers to Rehab  cognitive status  -JJ        Patient's Goals for Discharge  patient could not state  -JJ           Oral Motor and Function    Dentition Assessment  other (see comments) unable to examine teeth, pt would not open his mouth  -JJ        Secretion Management  WNL/WFL  -JJ        Mucosal Quality  moist, healthy  -JJ        Volitional Swallow  unable to elicit  -JJ        Volitional Cough  unable to elicit  -JJ           Oral Musculature and Cranial Nerve Assessment    Oral Motor General Assessment  unable to assess  -JJ           General Eating/Swallowing Observations    Respiratory Support Currently in Use  nasal cannula  -JJ        Eating/Swallowing Skills  fed by SLP  -JJ        Positioning During Eating  upright 90 degree;upright in bed  -JJ        Utensils Used  spoon  -JJ        Consistencies Trialed  -- ice chip  -JJ           Respiratory    Respiratory Status  WFL  -JJ           Clinical Swallow Eval    Clinical Swallow Evaluation Summary  Very limited swallow evaluation completed due to patient confusion and refusal. Patient would only accept an ice chip. He was noted to cough while attempting to swallow the ice chip. Patient adamantly refused all other trials and was attempting to get out of bed the whole evaluation to \"go home.\" RN reported that patient \"choked a little bit\" this morning when she attempted to give him his meds with puree. Patient does not appear safe for PO intake.  -JJ           Clinical Impression    SLP Swallowing Diagnosis  severe;other (see comments) cognitive dysphagia  -JJ        Functional Impact  risk of " aspiration/pneumonia;risk of malnutrition;risk of dehydration  -        Rehab Potential/Prognosis, Swallowing  adequate, monitor progress closely  -        Swallow Criteria for Skilled Therapeutic Interventions Met  demonstrates skilled criteria  -           Recommendations    Therapy Frequency (Swallow)  PRN  -        Predicted Duration Therapy Intervention (Days)  until discharge  -        SLP Diet Recommendation  NPO;ice chips between meals after oral care, with supervision  -        Recommended Diagnostics  reassess via clinical swallow evaluation  -        SLP Rec. for Method of Medication Administration  meds crushed;with pudding or applesauce;meds via alternate route  -        Monitor for Signs of Aspiration  yes;notify SLP if any concerns  -        Anticipated Dischage Disposition (SLP)  unknown  -          User Key  (r) = Recorded By, (t) = Taken By, (c) = Cosigned By    Initials Name Effective Dates    Estefanía Serna, MS CCC-SLP 06/08/18 -           EDUCATION  The patient has been educated in the following areas:   Dysphagia (Swallowing Impairment) NPO rationale.    SLP Recommendation and Plan  SLP Swallowing Diagnosis: severe, other (see comments)(cognitive dysphagia)  SLP Diet Recommendation: NPO, ice chips between meals after oral care, with supervision     SLP Rec. for Method of Medication Administration: meds crushed, with pudding or applesauce, meds via alternate route     Monitor for Signs of Aspiration: yes, notify SLP if any concerns  Recommended Diagnostics: reassess via clinical swallow evaluation  Swallow Criteria for Skilled Therapeutic Interventions Met: demonstrates skilled criteria  Anticipated Dischage Disposition (SLP): unknown  Rehab Potential/Prognosis, Swallowing: adequate, monitor progress closely  Therapy Frequency (Swallow): PRN  Predicted Duration Therapy Intervention (Days): until discharge       Plan of Care Reviewed With: patient  Progress: no  change         SLP Outcome Measures (last 72 hours)      SLP Outcome Measures     Row Name 07/01/20 1000             SLP Outcome Measures    Outcome Measure Used?  Adult NOMS  -J         Adult FCM Scores    FCM Chosen  Swallowing  -      Swallowing FCM Score  1  -        User Key  (r) = Recorded By, (t) = Taken By, (c) = Cosigned By    Initials Name Effective Dates    Estefanía Serna, MS CCC-SLP 06/08/18 -            Time Calculation:   Time Calculation- SLP     Row Name 07/01/20 1003             Time Calculation- SLP    SLP Received On  07/01/20  -LINDA        User Key  (r) = Recorded By, (t) = Taken By, (c) = Cosigned By    Initials Name Provider Type    Estefanía Serna, MS CCC-SLP Speech and Language Pathologist          Therapy Charges for Today     Code Description Service Date Service Provider Modifiers Qty    [id number] HC ST EVAL ORAL PHARYNG SWALLOW 2 7/1/2020 Estefanía Lisa, MS CCC-SLP GN 1               Estefanía Lisa MS CCC-SLP  7/1/2020

## 2020-07-01 NOTE — PROGRESS NOTES
"  PROGRESS NOTE  Patient Name: Albert Tellez  Age/Sex: 65 y.o. male  : 1955  MRN: [id number]    Date of Admission: 2020  Date of Encounter Visit: 20   LOS: 3 days   Patient Care Team:  Debra Narayan APRN as PCP - General (Nurse Practitioner)    Chief Complaint: Confusion, alcohol withdrawal    Hospital course: Patient came in with alcohol withdrawal, hyponatremia, thrombocytopenia, still in restraints but able to manage better with the Librium and the PRN Ativan.  Still not ready to come off the restraints or the Ativan, and needs to be given some more time.    Interval History: Patient is still requiring restraints, the wife was concerned about his long-term prognosis if he is started on tube feeding since he failed his swallow evaluation.  He did have some low blood sugar readings.  Hemodynamically is doing fine.  Patient was evaluated by speech therapy today    REVIEW OF SYSTEMS:   CONSTITUTIONAL: no fever or chills  CARDIOVASCULAR: No chest pain, chest pressure or chest discomfort. No palpitations or edema.   RESPIRATORY: No shortness of breath, cough or sputum.   GASTROINTESTINAL: No anorexia, nausea, vomiting or diarrhea. No abdominal pain or blood.   HEMATOLOGIC: No bleeding or bruising.  PSYCHIATRIC: Confused, restless, requiring restraints    Ventilator/Non-Invasive Ventilation Settings (From admission, onward)    Room air            Vital Signs  Temp:  [97.4 °F (36.3 °C)-98.6 °F (37 °C)] 97.4 °F (36.3 °C)  Heart Rate:  [] 85  Resp:  [18-24] 18  BP: (134-166)/() 162/98  SpO2:  [87 %-98 %] 93 %  on  Flow (L/min):  [1-1.5] 1 Device (Oxygen Therapy): nasal cannula    Intake/Output Summary (Last 24 hours) at 2020 1636  Last data filed at 2020 0858  Gross per 24 hour   Intake 1950 ml   Output --   Net 1950 ml     Flowsheet Rows      First Filed Value   Admission Height  170.2 cm (67\") Documented at 2020 2251   Admission Weight  87.7 kg (193 lb 6.4 oz) Documented at " 06/28/2020 0127        Body mass index is 27.62 kg/m².      06/29/20  0400 06/30/20  0551 07/01/20  0536   Weight: 84.8 kg (186 lb 15.2 oz) 85 kg (187 lb 6.3 oz) 80 kg (176 lb 5.9 oz)       Physical Exam:  GEN:  No acute distress, asleep, arousable, confused, requiring restraints  EYES:   Sclerae clear. No icterus. PERRL. Normal EOM  ENT:   External ears/nose normal, no oral lesions, no thrush, dry mucous membranes from oral breathing  NECK:  Supple, midline trachea, no JVD  LUNGS: Normal chest on inspection, CTAB, no wheezes. No rhonchi. No crackles. Respirations regular, even and unlabored.   CV:  Regular rhythm and rate. Normal S1/S2. No murmurs, gallops, or rubs noted.  ABD:  Soft, nontender and nondistended. Normal bowel sounds. No guarding  EXT:  Moves all extremities well. No cyanosis. No redness. No edema.   Skin: Dry, intact, no bleeding    Results Review:      Results from last 7 days   Lab Units 07/01/20  0439 06/30/20  0546 06/29/20  0402 06/28/20  0605 06/28/20  0350 06/27/20  2342   SODIUM mmol/L 129* 135* 131* 135*  --  133*   POTASSIUM mmol/L 3.5 4.0 3.6 3.4* 3.2* 3.6   CHLORIDE mmol/L 97* 99 101 103  --  99   CO2 mmol/L 20.0* 18.3* 20.0* 22.1  --  24.8   BUN mg/dL 6* 6* 4* 6*  --  7*   CREATININE mg/dL 0.61* 0.73* 0.52* 0.64*  --  0.70*   CALCIUM mg/dL 8.3* 8.7 7.7* 9.3  --  8.5*   AST (SGOT) U/L  --   --   --  118*  --  139*   ALT (SGPT) U/L  --   --   --  71*  --  79*   ANION GAP mmol/L 12.0 17.7* 10.0 9.9  --  9.2   ALBUMIN g/dL 3.30* 3.50  --  3.10*  --  3.70     Results from last 7 days   Lab Units 06/27/20  2342   CK TOTAL U/L 199             Results from last 7 days   Lab Units 07/01/20  0439 06/30/20  0546 06/29/20  0402 06/28/20  0605 06/27/20  2342   WBC 10*3/mm3 5.51 6.12 4.77 3.61 4.34   HEMOGLOBIN g/dL 13.0 14.1 12.5* 11.7* 12.6*   HEMATOCRIT % 36.6* 39.9 35.3* 32.9* 34.8*   PLATELETS 10*3/mm3 132* 127* 86* 92* 109*   MCV fL 99.7* 100.5* 101.1* 100.6* 100.3*   NEUTROPHIL % %  --   --    --   --  69.9   LYMPHOCYTE % %  --   --   --   --  14.5*   MONOCYTES % %  --   --   --   --  13.1*   EOSINOPHIL % %  --   --   --   --  1.8   BASOPHIL % %  --   --   --   --  0.2   IMM GRAN % %  --   --   --   --  0.5         Results from last 7 days   Lab Units 06/29/20  0402 06/28/20  0350 06/27/20  2342   MAGNESIUM mg/dL 1.9 2.2 1.8           Invalid input(s): LDLCALC          Glucose   Date/Time Value Ref Range Status   07/01/2020 1632 84 70 - 130 mg/dL Final   07/01/2020 0612 67 (L) 70 - 130 mg/dL Final   07/01/2020 0027 76 70 - 130 mg/dL Final   06/30/2020 1808 82 70 - 130 mg/dL Final   06/30/2020 1037 84 70 - 130 mg/dL Final   06/30/2020 0031 73 70 - 130 mg/dL Final   06/29/2020 1744 75 70 - 130 mg/dL Final   06/29/2020 1743 76 70 - 130 mg/dL Final             Results from last 7 days   Lab Units 06/28/20  0059   NITRITE UA  Negative                   Imaging:   Imaging Results (All)     Procedure Component Value Units Date/Time       I reviewed the patient's new clinical results.  I personally viewed and interpreted the patient's imaging results: No acute disease, minimal basilar atelectasis on the left side        Medication Review:     arformoterol 15 mcg Nebulization BID - RT   chlordiazePOXIDE 25 mg Oral Q6H   cloNIDine 0.1 mg Oral Q8H   enoxaparin 40 mg Subcutaneous Q24H   ferrous sulfate 325 mg Oral BID With Meals   insulin lispro 0-7 Units Subcutaneous Q6H   pantoprazole 40 mg Oral Q AM   sodium chloride 10 mL Intravenous Q12H         dextrose 5 % and sodium chloride 0.9 % 50 mL/hr Last Rate: 50 mL/hr (07/01/20 1406)       ASSESSMENT:   1. Alcohol withdrawal  2. Alcoholism  3. Acute alcoholic hepatitis  4. Thrombocytopenia  5. Acute hyponatremia  6. Acute hypokalemia  7. Anemia  8. Acute bronchitis  9. Former smoker  10. Hypoglycemia  11. 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  He is a high risk for p.o. feeding and he will be n.p.o., he is getting the D5 normal saline which will give him some calories, the wife is reluctant to consider nasogastric feeding tube because of the additional agitation as a result of that and what comes with it.  We will continue with the supportive measures, we will check ammonia level in the morning    Discussed with the wife, discussed with the nurse earlier    Disposition: home once withdrawal symptoms and confusion improves and once he is off restraints for more than 24 hours     Haydee Balderrama MD  07/01/20  16:36          Dictated utilizing Dragon dictation

## 2020-07-01 NOTE — PLAN OF CARE
Problem: Patient Care Overview  Goal: Plan of Care Review  Outcome: Ongoing (interventions implemented as appropriate)  Note:   VSS; safety maintained; Pt was confused and agitated at beginning of shift became alert,  amicable, cooperative and calm during afternoon; restraints removed successfully; continue to monitor

## 2020-07-01 NOTE — PLAN OF CARE
"  Problem: Patient Care Overview  Goal: Plan of Care Review  Outcome: Ongoing (interventions implemented as appropriate)  Flowsheets (Taken 7/1/2020 8634)  Progress: no change  Plan of Care Reviewed With: patient  Note:   Very limited swallow evaluation completed. Patient very confused but alert. He essentially refused all trials except for a single ice chip. He coughed with the ice chip and RN reported that patient \"choked a little\" this morning with pills in puree. Patient does not appear safe for PO intake due to severe cognitive dysphagia. Recommend NPO. Meds would be best via alternate route or crushed with puree as tolerated. May have ice chips with supervision. SLP to re-evaluate swallow tomorrow as able.     "

## 2020-07-01 NOTE — PROGRESS NOTES
Continued Stay Note  [facility]     Patient Name: Albert Tellez  MRN: [id number]  Today's Date: 7/1/2020    Admit Date: 6/27/2020    Discharge Plan     Row Name 07/01/20 1217       Plan    Plan  Inpatient program for ETOH when medically appropriate at [facility] or [facility] vs palliative    Plan Comments  Copy of living will was faxed to the stat HIM line and placed in CCP HIM basket to be be scanned.  Copy of living will placed in patient's chart on nursing unit as well...........................Chelly Sorto RN        Discharge Codes    No documentation.             Chelly Sorto RN

## 2020-07-01 NOTE — PLAN OF CARE
Problem: Fall Risk (Adult)  Goal: Identify Related Risk Factors and Signs and Symptoms  Outcome: Ongoing (interventions implemented as appropriate)  Note:   Pt refusing to take meds with applesauce. Pt will spit out applesauce. Pt moves around in bed a lot when ativan wears off and gets very restless and agitated. Oriented to self. Pt is very strong. IVF continue. CIWA score 15 and above most this shift. Pt is now on 1.5 L of O2. Pt has a brief. Pt will not drink or eat. Some labs better than day before. Ativan x 2 this shift. Pt resting now. Concerned that pt will not eat. Cont to monitor, safety maintained.

## 2020-07-01 NOTE — NURSING NOTE
Spoke with staff at [facility]. Unable to verify if personal belongings were there. [facility] staff did state they do not usually send their belongings with them to the hospital.

## 2020-07-02 LAB
ALBUMIN SERPL-MCNC: 3.2 G/DL (ref 3.5–5.2)
AMMONIA BLD-SCNC: 42 UMOL/L (ref 16–60)
ANION GAP SERPL CALCULATED.3IONS-SCNC: 10.5 MMOL/L (ref 5–15)
BUN SERPL-MCNC: 6 MG/DL (ref 8–23)
BUN/CREAT SERPL: 10.7 (ref 7–25)
CALCIUM SPEC-SCNC: 8.6 MG/DL (ref 8.6–10.5)
CHLORIDE SERPL-SCNC: 103 MMOL/L (ref 98–107)
CO2 SERPL-SCNC: 23.5 MMOL/L (ref 22–29)
CREAT SERPL-MCNC: 0.56 MG/DL (ref 0.76–1.27)
DEPRECATED RDW RBC AUTO: 45.2 FL (ref 37–54)
ERYTHROCYTE [DISTWIDTH] IN BLOOD BY AUTOMATED COUNT: 11.8 % (ref 12.3–15.4)
GFR SERPL CREATININE-BSD FRML MDRD: 147 ML/MIN/1.73
GLUCOSE BLDC GLUCOMTR-MCNC: 102 MG/DL (ref 70–130)
GLUCOSE BLDC GLUCOMTR-MCNC: 103 MG/DL (ref 70–130)
GLUCOSE SERPL-MCNC: 109 MG/DL (ref 65–99)
HCT VFR BLD AUTO: 40 % (ref 37.5–51)
HGB BLD-MCNC: 13.6 G/DL (ref 13–17.7)
MCH RBC QN AUTO: 35.3 PG (ref 26.6–33)
MCHC RBC AUTO-ENTMCNC: 34 G/DL (ref 31.5–35.7)
MCV RBC AUTO: 103.9 FL (ref 79–97)
PHOSPHATE SERPL-MCNC: 3.2 MG/DL (ref 2.5–4.5)
PLATELET # BLD AUTO: 150 10*3/MM3 (ref 140–450)
PMV BLD AUTO: 9.8 FL (ref 6–12)
POTASSIUM SERPL-SCNC: 3.5 MMOL/L (ref 3.5–5.2)
RBC # BLD AUTO: 3.85 10*6/MM3 (ref 4.14–5.8)
SODIUM SERPL-SCNC: 137 MMOL/L (ref 136–145)
WBC # BLD AUTO: 5.53 10*3/MM3 (ref 3.4–10.8)

## 2020-07-02 PROCEDURE — 85027 COMPLETE CBC AUTOMATED: CPT | Performed by: INTERNAL MEDICINE

## 2020-07-02 PROCEDURE — 82962 GLUCOSE BLOOD TEST: CPT

## 2020-07-02 PROCEDURE — 82140 ASSAY OF AMMONIA: CPT | Performed by: INTERNAL MEDICINE

## 2020-07-02 PROCEDURE — 94799 UNLISTED PULMONARY SVC/PX: CPT

## 2020-07-02 PROCEDURE — 25010000002 ENOXAPARIN PER 10 MG: Performed by: INTERNAL MEDICINE

## 2020-07-02 PROCEDURE — 92526 ORAL FUNCTION THERAPY: CPT

## 2020-07-02 PROCEDURE — 80069 RENAL FUNCTION PANEL: CPT | Performed by: INTERNAL MEDICINE

## 2020-07-02 RX ORDER — LISINOPRIL 10 MG/1
10 TABLET ORAL
Status: DISCONTINUED | OUTPATIENT
Start: 2020-07-02 | End: 2020-07-07 | Stop reason: HOSPADM

## 2020-07-02 RX ORDER — CHLORDIAZEPOXIDE HYDROCHLORIDE 25 MG/1
25 CAPSULE, GELATIN COATED ORAL 2 TIMES DAILY
Status: DISCONTINUED | OUTPATIENT
Start: 2020-07-02 | End: 2020-07-03

## 2020-07-02 RX ADMIN — ARFORMOTEROL TARTRATE 15 MCG: 15 SOLUTION RESPIRATORY (INHALATION) at 08:19

## 2020-07-02 RX ADMIN — FERROUS SULFATE TAB 325 MG (65 MG ELEMENTAL FE) 325 MG: 325 (65 FE) TAB at 13:52

## 2020-07-02 RX ADMIN — DEXTROSE AND SODIUM CHLORIDE 50 ML/HR: 5; 900 INJECTION, SOLUTION INTRAVENOUS at 09:39

## 2020-07-02 RX ADMIN — CLONIDINE HYDROCHLORIDE 0.1 MG: 0.1 TABLET ORAL at 21:45

## 2020-07-02 RX ADMIN — ENOXAPARIN SODIUM 40 MG: 40 INJECTION SUBCUTANEOUS at 07:01

## 2020-07-02 RX ADMIN — ARFORMOTEROL TARTRATE 15 MCG: 15 SOLUTION RESPIRATORY (INHALATION) at 19:43

## 2020-07-02 RX ADMIN — FERROUS SULFATE TAB 325 MG (65 MG ELEMENTAL FE) 325 MG: 325 (65 FE) TAB at 21:45

## 2020-07-02 RX ADMIN — SODIUM CHLORIDE, PRESERVATIVE FREE 10 ML: 5 INJECTION INTRAVENOUS at 09:40

## 2020-07-02 RX ADMIN — PANTOPRAZOLE SODIUM 40 MG: 40 TABLET, DELAYED RELEASE ORAL at 05:11

## 2020-07-02 RX ADMIN — CHLORDIAZEPOXIDE HYDROCHLORIDE 25 MG: 25 CAPSULE ORAL at 21:45

## 2020-07-02 RX ADMIN — LISINOPRIL 10 MG: 10 TABLET ORAL at 21:45

## 2020-07-02 RX ADMIN — CLONIDINE HYDROCHLORIDE 0.1 MG: 0.1 TABLET ORAL at 13:52

## 2020-07-02 RX ADMIN — CHLORDIAZEPOXIDE HYDROCHLORIDE 25 MG: 25 CAPSULE ORAL at 13:52

## 2020-07-02 RX ADMIN — CLONIDINE HYDROCHLORIDE 0.1 MG: 0.1 TABLET ORAL at 05:11

## 2020-07-02 RX ADMIN — CHLORDIAZEPOXIDE HYDROCHLORIDE 25 MG: 25 CAPSULE ORAL at 05:11

## 2020-07-02 RX ADMIN — SODIUM CHLORIDE, PRESERVATIVE FREE 10 ML: 5 INJECTION INTRAVENOUS at 21:46

## 2020-07-02 NOTE — PROGRESS NOTES
"  PROGRESS NOTE  Patient Name: Albert Tellez  Age/Sex: 65 y.o. male  : 1955  MRN: [id number]    Date of Admission: 2020  Date of Encounter Visit: 20   LOS: 4 days   Patient Care Team:  Debra Narayan APRN as PCP - General (Nurse Practitioner)    Chief Complaint: Drastic improvement in his confusion and withdrawal symptoms.    Hospital course: Patient came in with alcohol withdrawal, hyponatremia, thrombocytopenia,  was having significant prolonged encephalopathy but finally he came around as of last night and has been doing much better since.    Interval History: Patient no longer requiring restraints, he is oriented, he is alert and calm, he was sleeping most of the time however.  No fever or chills, he is very weak and deconditioned and needs some physical therapy.  Patient was evaluated by speech therapy today and he did pass with thickened but was still not cleared for thin liquid    REVIEW OF SYSTEMS:   CONSTITUTIONAL: no fever or chills  CARDIOVASCULAR: No chest pain, chest pressure or chest discomfort. No palpitations or edema.   RESPIRATORY: No shortness of breath, cough or sputum.   GASTROINTESTINAL: No anorexia, nausea, vomiting or diarrhea. No abdominal pain or blood.   HEMATOLOGIC: No bleeding or bruising.  PSYCHIATRIC: Awake and alert, much more appropriate and responsive Nts    Ventilator/Non-Invasive Ventilation Settings (From admission, onward)    Room air            Vital Signs  Temp:  [97.9 °F (36.6 °C)-98.7 °F (37.1 °C)] 98.5 °F (36.9 °C)  Heart Rate:  [71-85] 82  Resp:  [16-20] 16  BP: (124-176)/(68-97) 144/88  SpO2:  [94 %-99 %] 95 %  on  Flow (L/min):  [1-2] 1 Device (Oxygen Therapy): room air  No intake or output data in the 24 hours ending 20 1742  Flowsheet Rows      First Filed Value   Admission Height  170.2 cm (67\") Documented at 2020 2251   Admission Weight  87.7 kg (193 lb 6.4 oz) Documented at 2020 0127        Body mass index is 27.62 kg/m².      " 06/29/20  0400 06/30/20  0551 07/01/20  0536   Weight: 84.8 kg (186 lb 15.2 oz) 85 kg (187 lb 6.3 oz) 80 kg (176 lb 5.9 oz)       Physical Exam:  GEN:  No acute distress, awake responsive, calm and cooperative and appropriate   EYES:   Sclerae clear. No icterus. PERRL. Normal EOM  ENT:   External ears/nose normal, no oral lesions, no thrush, dry mucous membranes from oral breathing  NECK:  Supple, midline trachea, no JVD  LUNGS: Normal chest on inspection, CTAB, no wheezes. No rhonchi. No crackles. Respirations regular, even and unlabored.   CV:  Regular rhythm and rate. Normal S1/S2. No murmurs, gallops, or rubs noted.  ABD:  Soft, nontender and nondistended. Normal bowel sounds. No guarding  EXT:  Moves all extremities well. No cyanosis. No redness. No edema.   Skin: Dry, intact, no bleeding    Results Review:      Results from last 7 days   Lab Units 07/02/20  0314 07/01/20  0439 06/30/20  0546 06/29/20  0402 06/28/20  0605 06/28/20  0350 06/27/20  2342   SODIUM mmol/L 137 129* 135* 131* 135*  --  133*   POTASSIUM mmol/L 3.5 3.5 4.0 3.6 3.4* 3.2* 3.6   CHLORIDE mmol/L 103 97* 99 101 103  --  99   CO2 mmol/L 23.5 20.0* 18.3* 20.0* 22.1  --  24.8   BUN mg/dL 6* 6* 6* 4* 6*  --  7*   CREATININE mg/dL 0.56* 0.61* 0.73* 0.52* 0.64*  --  0.70*   CALCIUM mg/dL 8.6 8.3* 8.7 7.7* 9.3  --  8.5*   AST (SGOT) U/L  --   --   --   --  118*  --  139*   ALT (SGPT) U/L  --   --   --   --  71*  --  79*   ANION GAP mmol/L 10.5 12.0 17.7* 10.0 9.9  --  9.2   ALBUMIN g/dL 3.20* 3.30* 3.50  --  3.10*  --  3.70     Results from last 7 days   Lab Units 06/27/20  2342   CK TOTAL U/L 199             Results from last 7 days   Lab Units 07/02/20  0314 07/01/20  0439 06/30/20  0546 06/29/20  0402 06/28/20  0605 06/27/20  2342   WBC 10*3/mm3 5.53 5.51 6.12 4.77 3.61 4.34   HEMOGLOBIN g/dL 13.6 13.0 14.1 12.5* 11.7* 12.6*   HEMATOCRIT % 40.0 36.6* 39.9 35.3* 32.9* 34.8*   PLATELETS 10*3/mm3 150 132* 127* 86* 92* 109*   MCV fL 103.9* 99.7*  100.5* 101.1* 100.6* 100.3*   NEUTROPHIL % %  --   --   --   --   --  69.9   LYMPHOCYTE % %  --   --   --   --   --  14.5*   MONOCYTES % %  --   --   --   --   --  13.1*   EOSINOPHIL % %  --   --   --   --   --  1.8   BASOPHIL % %  --   --   --   --   --  0.2   IMM GRAN % %  --   --   --   --   --  0.5   Ammonia level: 42      Results from last 7 days   Lab Units 06/29/20  0402 06/28/20  0350 06/27/20  2342   MAGNESIUM mg/dL 1.9 2.2 1.8           Invalid input(s): LDLCALC          Glucose   Date/Time Value Ref Range Status   07/02/2020 1129 102 70 - 130 mg/dL Final   07/01/2020 2319 114 70 - 130 mg/dL Final   07/01/2020 1632 84 70 - 130 mg/dL Final   07/01/2020 0612 67 (L) 70 - 130 mg/dL Final   07/01/2020 0027 76 70 - 130 mg/dL Final   06/30/2020 1808 82 70 - 130 mg/dL Final   06/30/2020 1037 84 70 - 130 mg/dL Final   06/30/2020 0031 73 70 - 130 mg/dL Final             Results from last 7 days   Lab Units 06/28/20  0059   NITRITE UA  Negative                   Imaging:   Imaging Results (All)     Procedure Component Value Units Date/Time       I reviewed the patient's new clinical results.  I personally viewed and interpreted the patient's imaging results: No acute disease, minimal basilar atelectasis on the left side        Medication Review:     arformoterol 15 mcg Nebulization BID - RT   chlordiazePOXIDE 25 mg Oral Q6H   cloNIDine 0.1 mg Oral Q8H   enoxaparin 40 mg Subcutaneous Q24H   ferrous sulfate 325 mg Oral BID With Meals   insulin lispro 0-7 Units Subcutaneous Q6H   pantoprazole 40 mg Oral Q AM   sodium chloride 10 mL Intravenous Q12H         dextrose 5 % and sodium chloride 0.9 % 50 mL/hr Last Rate: Stopped (07/02/20 1046)       ASSESSMENT:   1. Alcohol withdrawal  2. Alcoholism  3. Acute alcoholic hepatitis  4. Thrombocytopenia  5. Acute hyponatremia  6. Acute hypokalemia  7. Anemia  8. Acute bronchitis  9. Former smoker  10. Hypoglycemia  11. 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 Hypertension    PLAN:  Hypoglycemia is better now that he is eating he no longer requires the D5 IV fluid.  White count is normal, platelet count has normalized, kidney function is better and sodium has corrected as well  Patient should be cleared for discharge tomorrow we will have physical therapy to assess and see if basement is indicated, he is interested in seeking help for his alcohol issues after discharge  Start lisinopril for blood pressure control in addition to the clonazepam clonidine,  Start reducing the dose on the Librium    We will get access center for recommendation and will get physical therapy to assess    Disposition: home hopefully tomorrow    Haydee Balderrama MD  07/02/20  17:42          Dictated utilizing Dragon dictation

## 2020-07-02 NOTE — THERAPY RE-EVALUATION
Acute Care - Speech Language Pathology   Swallow Re-Evaluation [facility]     Patient Name: Albert Tellez  : 1955  MRN: [id number]  Today's Date: 2020               Admit Date: 2020    Visit Dx:     ICD-10-CM ICD-9-CM   1. DTs (delirium tremens) (CMS/Formerly [facility]) F10.231 291.0     Patient Active Problem List   Diagnosis   • Spinal stenosis of lumbar region with neurogenic claudication   • Lumbar spinal stenosis   • DTs (delirium tremens) (CMS/Formerly [facility])     Past Medical History:   Diagnosis Date   • Hearing loss     JEREMI HEARING AIDS   • History of melanoma     REMOVED FROM RIGHT TEMPLE   • Hypertension     IN THE PAST. RESOLVED. NO MEDS CURRENTLY   • Rosacea    • Skin cancer     MELANOMA REMOVED FROM RIGHT TEMPLE   • Spinal stenosis, lumbar     L3-4, L4-5     Past Surgical History:   Procedure Laterality Date   • ARM LACERATION REPAIR Left    • LUMBAR LAMINECTOMY DISCECTOMY DECOMPRESSION N/A 2018    Procedure: L3-4,4-5 laminectomy;  Surgeon: Shane Ridley MD;  Location: [facility];  Service: Orthopedic Spine   • TONSILLECTOMY     • TOTAL HIP ARTHROPLASTY Left 2012   • WISDOM TOOTH EXTRACTION          SWALLOW EVALUATION (last 72 hours)      SLP Adult Swallow Evaluation     Row Name 20 1130 20 0900                Rehab Evaluation    Document Type  evaluation  -  evaluation  -JJ       Patient Observations  agree to therapy  -  poorly cooperative  -JJ       Patient/Family Observations  --  Patient in wrist restraints, essentially refused all trials, extrememly confused  -JJ       Patient Effort  adequate  -  poor  -JJ       Symptoms Noted During/After Treatment  --  other (see comments) confusion  -JJ          General Information    Patient Profile Reviewed  yes  -SH  yes  -JJ       Pertinent History Of Current Problem  DTs  -  admitted with alcohol withrawal delirium, acute bronchitis, chest xray clear per MD note  -JJ       Current Method of Nutrition  clear  liquids;other (see comments) it appears they are holding PO  -SH  clear liquids;other (see comments) Patient has been refusing PO  -JJ       Precautions/Limitations, Vision  WFL;for purposes of eval  -SH  WFL;for purposes of eval  -JJ       Precautions/Limitations, Hearing  WFL;for purposes of eval  -SH  WFL;for purposes of eval  -JJ       Prior Level of Function-Communication  other (see comments) UNKNOWN  -  other (see comments) baseline unknown  -JJ       Prior Level of Function-Swallowing  no diet consistency restrictions  -  no diet consistency restrictions  -JJ       Plans/Goals Discussed with  patient  -  patient  -JJ       Barriers to Rehab  cognitive status  -  cognitive status  -JJ       Patient's Goals for Discharge  --  patient could not state  -JJ          Oral Motor and Function    Dentition Assessment  --  other (see comments) unable to examine teeth, pt would not open his mouth  -JJ       Secretion Management  --  WNL/WFL  -JJ       Mucosal Quality  --  moist, healthy  -JJ       Volitional Swallow  --  unable to elicit  -JJ       Volitional Cough  --  unable to elicit  -JJ          Oral Musculature and Cranial Nerve Assessment    Oral Motor General Assessment  --  unable to assess  -JJ          General Eating/Swallowing Observations    Respiratory Support Currently in Use  --  nasal cannula  -JJ       Eating/Swallowing Skills  --  fed by SLP  -JJ       Positioning During Eating  --  upright 90 degree;upright in bed  -JJ       Utensils Used  --  spoon  -JJ       Consistencies Trialed  --  -- ice chip  -JJ          Respiratory    Respiratory Status  --  WFL  -JJ          Clinical Swallow Eval    Clinical Swallow Evaluation Summary  Patient seen for re eval of swallow function. No overt s/s of aspiration with ice, nectar via cup, or puree. Immediate cough with thins via spoon and strained mech soft. Rotary mastication observed with mech soft. No oral residue post swallow. Throat clearing with  "nectar via straw. SLP recs nectar, no straws, and puree. Meds whole in puree/pudding. Allow free water protocol with ice. Assist with meals.   -  Very limited swallow evaluation completed due to patient confusion and refusal. Patient would only accept an ice chip. He was noted to cough while attempting to swallow the ice chip. Patient adamantly refused all other trials and was attempting to get out of bed the whole evaluation to \"go home.\" RN reported that patient \"choked a little bit\" this morning when she attempted to give him his meds with puree. Patient does not appear safe for PO intake.  -JJ          Clinical Impression    SLP Swallowing Diagnosis  oral dysfunction;suspected pharyngeal dysfunction  -  severe;other (see comments) cognitive dysphagia  -JJ       Functional Impact  risk of aspiration/pneumonia;risk of malnutrition;risk of dehydration  -  risk of aspiration/pneumonia;risk of malnutrition;risk of dehydration  -J       Rehab Potential/Prognosis, Swallowing  adequate, monitor progress closely  -  adequate, monitor progress closely  -J       Swallow Criteria for Skilled Therapeutic Interventions Met  demonstrates skilled criteria  -  demonstrates skilled criteria  -JJ          Recommendations    Therapy Frequency (Swallow)  PRN  -  PRN  -JJ       Predicted Duration Therapy Intervention (Days)  until discharge  -  until discharge  -JJ       SLP Diet Recommendation  puree;nectar thick liquids;ice chips between meals after oral care, with supervision  -  NPO;ice chips between meals after oral care, with supervision  -JJ       Recommended Diagnostics  reassess via clinical swallow evaluation  -  reassess via clinical swallow evaluation  -JJ       Recommended Precautions and Strategies  upright posture during/after eating;small bites of food and sips of liquid;no straw;other (see comments) 1:1 ASSIST   -  --       SLP Rec. for Method of Medication Administration  meds whole;meds " crushed;with pudding or applesauce;as tolerated  -  meds crushed;with pudding or applesauce;meds via alternate route  -       Monitor for Signs of Aspiration  yes;notify SLP if any concerns  -  yes;notify SLP if any concerns  -SANJUANA       Anticipated Dischage Disposition (SLP)  unknown  -  unknown  -         User Key  (r) = Recorded By, (t) = Taken By, (c) = Cosigned By    Initials Name Effective Dates    Estefanía Serna, MS CCC-SLP 06/08/18 -     SH Fabio Karen RACH, MS CCC-SLP 03/07/18 -           EDUCATION  The patient has been educated in the following areas:   Dysphagia (Swallowing Impairment).    SLP Recommendation and Plan  SLP Swallowing Diagnosis: oral dysfunction, suspected pharyngeal dysfunction  SLP Diet Recommendation: puree, nectar thick liquids, ice chips between meals after oral care, with supervision  Recommended Precautions and Strategies: upright posture during/after eating, small bites of food and sips of liquid, no straw, other (see comments)(1:1 ASSIST )  SLP Rec. for Method of Medication Administration: meds whole, meds crushed, with pudding or applesauce, as tolerated     Monitor for Signs of Aspiration: yes, notify SLP if any concerns  Recommended Diagnostics: reassess via clinical swallow evaluation  Swallow Criteria for Skilled Therapeutic Interventions Met: demonstrates skilled criteria  Anticipated Dischage Disposition (SLP): unknown  Rehab Potential/Prognosis, Swallowing: adequate, monitor progress closely  Therapy Frequency (Swallow): PRN  Predicted Duration Therapy Intervention (Days): until discharge       Plan of Care Reviewed With: patient  Progress: improving  Outcome Summary: Patient seen for re eval of swallow function. No overt s/s of aspiration with ice, nectar via cup, or puree. Immediate cough with thins via spoon and strained mech soft. Throat clearing with nectar via straw. SLP recs nectar, no straws, and puree. Meds whole in puree/pudding. Allow free  water protocol with ice.         SLP Outcome Measures (last 72 hours)      SLP Outcome Measures     Row Name 07/02/20 1600 07/01/20 1000          SLP Outcome Measures    Outcome Measure Used?  Adult NOMS  -  Adult NOMS  -JJ        Adult FCM Scores    FCM Chosen  Swallowing  -  Swallowing  -J     Swallowing FCM Score  4  -  1  -JJ       User Key  (r) = Recorded By, (t) = Taken By, (c) = Cosigned By    Initials Name Effective Dates    Estefanía Serna, MS CCC-SLP 06/08/18 -      Karen Mccarthy MS CCC-SLP 03/07/18 -            Time Calculation:   Time Calculation- SLP     Row Name 07/02/20 1603             Time Calculation- SLP    SLP Start Time  1130  -      SLP Received On  07/02/20  -        User Key  (r) = Recorded By, (t) = Taken By, (c) = Cosigned By    Initials Name Provider Type     Karen Mccarthy MS CCC-SLP Speech and Language Pathologist          Therapy Charges for Today     Code Description Service Date Service Provider Modifiers Qty    [id number]  ST TREATMENT SWALLOW 4 7/2/2020 Karen Mccarthy MS CCC-SLP GN 1               Karen Mccarthy MS CCC-SLP  7/2/2020

## 2020-07-02 NOTE — PLAN OF CARE
VSS. CIWA 2 all day for tremor and sweating. No PRNs given. Pt slept all day. Will wake to answer questions appropriately and then goes back to sleep. ST saw today; see recs. No visitors today. Palliative care to touch base with family. IVFs stopped. On room air most of the afternoon.

## 2020-07-02 NOTE — PLAN OF CARE
Problem: Fall Risk (Adult)  Goal: Identify Related Risk Factors and Signs and Symptoms  Outcome: Ongoing (interventions implemented as appropriate)  Note:   Pt did much better this shift. Wife came in and talked to pt and brought some ravioli, but speech recommended npo only pureed for meds. Pt seemed to wake up more around midnight and asked for food and coffee. Pt did feel with small amount of ice cream and necter thick coffee. Pt asked for a bible and was oriented and pleasant. Asked for a urinal. No prn ativan given this shift. CIWA around 4. Cont to monitor, safety maintained.

## 2020-07-02 NOTE — PLAN OF CARE
Problem: Patient Care Overview  Goal: Plan of Care Review  Flowsheets (Taken 7/2/2020 7534)  Progress: improving  Plan of Care Reviewed With: patient  Outcome Summary: Patient seen for re eval of swallow function. No overt s/s of aspiration with ice, nectar via cup, or puree. Immediate cough with thins via spoon and strained mech soft. Rotary mastication observed with mech soft. No oral residue post swallow. Throat clearing with nectar via straw. SLP recs nectar, no straws, and puree. Meds whole in puree/pudding. Allow free water protocol with ice. Assist with meals.     Patient was not wearing a face mask during this therapy encounter. Therapist used appropriate personal protective equipment including mask, eye protection and gloves.  Mask used was standard procedure mask. Appropriate PPE was worn during the entire therapy session. Hand hygiene was completed before and after therapy session. Patient is not in enhanced droplet precautions.

## 2020-07-02 NOTE — NURSING NOTE
Access center consult attempt:    Spoke with GINA Martinez who reports pt is more alert, but sleeping a lot.  Upon approach, pt was snoring. Attempted to awaken pt several times, he seemed to wake up at one point, but when I started talking to him he fell back to sleep.  Will attempt consult later in day today.

## 2020-07-03 LAB
ALBUMIN SERPL-MCNC: 3.3 G/DL (ref 3.5–5.2)
ANION GAP SERPL CALCULATED.3IONS-SCNC: 12.8 MMOL/L (ref 5–15)
BUN SERPL-MCNC: 8 MG/DL (ref 8–23)
BUN/CREAT SERPL: 11.9 (ref 7–25)
CALCIUM SPEC-SCNC: 8.7 MG/DL (ref 8.6–10.5)
CHLORIDE SERPL-SCNC: 101 MMOL/L (ref 98–107)
CO2 SERPL-SCNC: 23.2 MMOL/L (ref 22–29)
CREAT SERPL-MCNC: 0.67 MG/DL (ref 0.76–1.27)
DEPRECATED RDW RBC AUTO: 40.8 FL (ref 37–54)
ERYTHROCYTE [DISTWIDTH] IN BLOOD BY AUTOMATED COUNT: 11.2 % (ref 12.3–15.4)
GFR SERPL CREATININE-BSD FRML MDRD: 119 ML/MIN/1.73
GLUCOSE BLDC GLUCOMTR-MCNC: 125 MG/DL (ref 70–130)
GLUCOSE BLDC GLUCOMTR-MCNC: 88 MG/DL (ref 70–130)
GLUCOSE BLDC GLUCOMTR-MCNC: 98 MG/DL (ref 70–130)
GLUCOSE SERPL-MCNC: 85 MG/DL (ref 65–99)
HCT VFR BLD AUTO: 37.5 % (ref 37.5–51)
HGB BLD-MCNC: 13.5 G/DL (ref 13–17.7)
MCH RBC QN AUTO: 35.3 PG (ref 26.6–33)
MCHC RBC AUTO-ENTMCNC: 36 G/DL (ref 31.5–35.7)
MCV RBC AUTO: 98.2 FL (ref 79–97)
PHOSPHATE SERPL-MCNC: 3.2 MG/DL (ref 2.5–4.5)
PLATELET # BLD AUTO: 205 10*3/MM3 (ref 140–450)
PMV BLD AUTO: 9.7 FL (ref 6–12)
POTASSIUM SERPL-SCNC: 3.3 MMOL/L (ref 3.5–5.2)
POTASSIUM SERPL-SCNC: 3.7 MMOL/L (ref 3.5–5.2)
RBC # BLD AUTO: 3.82 10*6/MM3 (ref 4.14–5.8)
SODIUM SERPL-SCNC: 137 MMOL/L (ref 136–145)
WBC # BLD AUTO: 4.09 10*3/MM3 (ref 3.4–10.8)

## 2020-07-03 PROCEDURE — 97110 THERAPEUTIC EXERCISES: CPT

## 2020-07-03 PROCEDURE — 25010000002 ENOXAPARIN PER 10 MG: Performed by: INTERNAL MEDICINE

## 2020-07-03 PROCEDURE — 25010000003 POTASSIUM CHLORIDE 10 MEQ/100ML SOLUTION: Performed by: INTERNAL MEDICINE

## 2020-07-03 PROCEDURE — 97162 PT EVAL MOD COMPLEX 30 MIN: CPT

## 2020-07-03 PROCEDURE — 94799 UNLISTED PULMONARY SVC/PX: CPT

## 2020-07-03 PROCEDURE — 82962 GLUCOSE BLOOD TEST: CPT

## 2020-07-03 PROCEDURE — 84132 ASSAY OF SERUM POTASSIUM: CPT | Performed by: INTERNAL MEDICINE

## 2020-07-03 PROCEDURE — 92526 ORAL FUNCTION THERAPY: CPT

## 2020-07-03 PROCEDURE — 90791 PSYCH DIAGNOSTIC EVALUATION: CPT

## 2020-07-03 PROCEDURE — 85027 COMPLETE CBC AUTOMATED: CPT | Performed by: INTERNAL MEDICINE

## 2020-07-03 PROCEDURE — 80069 RENAL FUNCTION PANEL: CPT | Performed by: INTERNAL MEDICINE

## 2020-07-03 RX ORDER — CHLORDIAZEPOXIDE HYDROCHLORIDE 25 MG/1
25 CAPSULE, GELATIN COATED ORAL EVERY MORNING
Status: DISCONTINUED | OUTPATIENT
Start: 2020-07-04 | End: 2020-07-04

## 2020-07-03 RX ADMIN — LORAZEPAM 2 MG: 1 TABLET ORAL at 00:19

## 2020-07-03 RX ADMIN — CLONIDINE HYDROCHLORIDE 0.1 MG: 0.1 TABLET ORAL at 06:07

## 2020-07-03 RX ADMIN — SODIUM CHLORIDE, PRESERVATIVE FREE 10 ML: 5 INJECTION INTRAVENOUS at 21:42

## 2020-07-03 RX ADMIN — LORAZEPAM 1 MG: 1 TABLET ORAL at 21:34

## 2020-07-03 RX ADMIN — POTASSIUM CHLORIDE 10 MEQ: 7.46 INJECTION, SOLUTION INTRAVENOUS at 09:37

## 2020-07-03 RX ADMIN — DEXTROSE AND SODIUM CHLORIDE 50 ML/HR: 5; 900 INJECTION, SOLUTION INTRAVENOUS at 09:38

## 2020-07-03 RX ADMIN — ARFORMOTEROL TARTRATE 15 MCG: 15 SOLUTION RESPIRATORY (INHALATION) at 19:11

## 2020-07-03 RX ADMIN — FERROUS SULFATE TAB 325 MG (65 MG ELEMENTAL FE) 325 MG: 325 (65 FE) TAB at 09:38

## 2020-07-03 RX ADMIN — CLONIDINE HYDROCHLORIDE 0.1 MG: 0.1 TABLET ORAL at 21:26

## 2020-07-03 RX ADMIN — FERROUS SULFATE TAB 325 MG (65 MG ELEMENTAL FE) 325 MG: 325 (65 FE) TAB at 18:04

## 2020-07-03 RX ADMIN — LISINOPRIL 10 MG: 10 TABLET ORAL at 09:39

## 2020-07-03 RX ADMIN — POTASSIUM CHLORIDE 10 MEQ: 7.46 INJECTION, SOLUTION INTRAVENOUS at 11:11

## 2020-07-03 RX ADMIN — PANTOPRAZOLE SODIUM 40 MG: 40 TABLET, DELAYED RELEASE ORAL at 06:07

## 2020-07-03 RX ADMIN — POTASSIUM CHLORIDE 10 MEQ: 7.46 INJECTION, SOLUTION INTRAVENOUS at 12:59

## 2020-07-03 RX ADMIN — ARFORMOTEROL TARTRATE 15 MCG: 15 SOLUTION RESPIRATORY (INHALATION) at 07:04

## 2020-07-03 RX ADMIN — ENOXAPARIN SODIUM 40 MG: 40 INJECTION SUBCUTANEOUS at 06:07

## 2020-07-03 RX ADMIN — SODIUM CHLORIDE, PRESERVATIVE FREE 10 ML: 5 INJECTION INTRAVENOUS at 09:38

## 2020-07-03 RX ADMIN — CHLORDIAZEPOXIDE HYDROCHLORIDE 25 MG: 25 CAPSULE ORAL at 09:39

## 2020-07-03 NOTE — PLAN OF CARE
Problem: Patient Care Overview  Goal: Plan of Care Review  Flowsheets (Taken 7/3/2020 1111)  Progress: improving  Plan of Care Reviewed With: patient  Outcome Summary: Patient seen for re eval of swallow function. Pt continues to improve. No overt s/s of aspiration with ice or thins via spoon. Occasional throat clearing with thins via cup and straw. No overt s/s of aspiration with nectar via straw, voice clear post swallow. Double swallow with puree. Delayed cough with large bolus mech soft. Confusion persists. SLP recs continue nectar, but allow straws. Will upgrade to fork mash. Meds whole with puree or nectar. Allow free water protocol (wait 30 mins after PO, complete oral care, then allow patient sips of thins or ice). Will re assess early next week for possible upgrade/tolerance.

## 2020-07-03 NOTE — THERAPY RE-EVALUATION
Acute Care - Speech Language Pathology   Swallow Re-Evaluation [facility]     Patient Name: Albert Tellez  : 1955  MRN: [id number]  Today's Date: 7/3/2020               Admit Date: 2020    Visit Dx:     ICD-10-CM ICD-9-CM   1. DTs (delirium tremens) (CMS/Formerly [facility]) F10.231 291.0     Patient Active Problem List   Diagnosis   • Spinal stenosis of lumbar region with neurogenic claudication   • Lumbar spinal stenosis   • DTs (delirium tremens) (CMS/Formerly [facility])     Past Medical History:   Diagnosis Date   • Hearing loss     JEREMI HEARING AIDS   • History of melanoma     REMOVED FROM RIGHT TEMPLE   • Hypertension     IN THE PAST. RESOLVED. NO MEDS CURRENTLY   • Rosacea    • Skin cancer     MELANOMA REMOVED FROM RIGHT TEMPLE   • Spinal stenosis, lumbar     L3-4, L4-5     Past Surgical History:   Procedure Laterality Date   • ARM LACERATION REPAIR Left    • LUMBAR LAMINECTOMY DISCECTOMY DECOMPRESSION N/A 2018    Procedure: L3-4,4-5 laminectomy;  Surgeon: Shane Ridley MD;  Location: [facility];  Service: Orthopedic Spine   • TONSILLECTOMY     • TOTAL HIP ARTHROPLASTY Left 2012   • WISDOM TOOTH EXTRACTION          SWALLOW EVALUATION (last 72 hours)      SLP Adult Swallow Evaluation     Row Name 20 1015 20 1130 20 0900          Document Type  re-evaluation  -SH  evaluation  -SH  evaluation  -JJ    Patient Observations  agree to therapy  -SH  agree to therapy  -SH  poorly cooperative  -JJ    Patient/Family Observations  --  --  Patient in wrist restraints, essentially refused all trials, extrememly confused  -JJ    Patient Effort  adequate  -SH  adequate  -SH  poor  -JJ    Symptoms Noted During/After Treatment  --  --  other (see comments) confusion  -JJ          Patient Profile Reviewed  yes  -SH  yes  -SH  yes  -JJ    Pertinent History Of Current Problem  DTs  -SH  DTs  -SH  admitted with alcohol withrawal delirium, acute bronchitis, chest xray clear per MD note  -JJ    Current  Method of Nutrition  pureed;nectar/syrup-thick liquids  -  clear liquids;other (see comments) it appears they are holding PO  -SH  clear liquids;other (see comments) Patient has been refusing PO  -JJ    Precautions/Limitations, Vision  WFL;for purposes of eval  -SH  WFL;for purposes of eval  -SH  WFL;for purposes of eval  -JJ    Precautions/Limitations, Hearing  WFL;for purposes of eval  -SH  WFL;for purposes of eval  -SH  WFL;for purposes of eval  -JJ    Prior Level of Function-Communication  other (see comments) unknown  -  other (see comments) UNKNOWN  -  other (see comments) baseline unknown  -JJ    Prior Level of Function-Swallowing  no diet consistency restrictions  -  no diet consistency restrictions  -  no diet consistency restrictions  -JJ    Plans/Goals Discussed with  patient  -  patient  -  patient  -JJ    Barriers to Rehab  cognitive status  -  cognitive status  -  cognitive status  -JJ    Patient's Goals for Discharge  --  --  patient could not state  -JJ          Dentition Assessment  --  --  other (see comments) unable to examine teeth, pt would not open his mouth  -JJ    Secretion Management  WNL/WFL  -SH  --  WNL/WFL  -JJ    Mucosal Quality  --  --  moist, healthy  -JJ    Volitional Swallow  delayed  -  --  unable to elicit  -JJ    Volitional Cough  weak  -  --  unable to elicit  -JJ          Oral Motor General Assessment  generalized oral motor weakness  -  --  unable to assess  -JJ          Respiratory Support Currently in Use  --  --  nasal cannula  -JJ    Eating/Swallowing Skills  --  --  fed by SLP  -JJ    Positioning During Eating  --  --  upright 90 degree;upright in bed  -JJ    Utensils Used  --  --  spoon  -JJ    Consistencies Trialed  --  --  -- ice chip  -JJ          Respiratory Status  --  --  WFL  -JJ          Clinical Swallow Evaluation Summary  Patient seen for re eval of swallow function. Pt continues to improved. No overt s/s of aspiration with ice or spoon.  "Occasional throat clearing with thins via cup and straw. No overt s/s of aspiration with nectar via straw, voice clear post swallow. Double swallow with puree. Delayed cough with large bolus mech soft. Confusion persists. SLP recs continue nectar, but allow straws. Will upgrade to fork mash. Meds whole with puree or nectar. Allow free water protocol (wait 30 mins after PO, complete oral care, then allow patient sips of thins or ice). Will re assess early next week for possible upgrade/tolerance.   -  Patient seen for re eval of swallow function. No overt s/s of aspiration with ice, nectar via cup, or puree. Immediate cough with thins via spoon and strained mech soft. Rotary mastication observed with mech soft. No oral residue post swallow. Throat clearing with nectar via straw. SLP recs nectar, no straws, and puree. Meds whole in puree/pudding. Allow free water protocol with ice. Assist with meals.   -  Very limited swallow evaluation completed due to patient confusion and refusal. Patient would only accept an ice chip. He was noted to cough while attempting to swallow the ice chip. Patient adamantly refused all other trials and was attempting to get out of bed the whole evaluation to \"go home.\" RN reported that patient \"choked a little bit\" this morning when she attempted to give him his meds with puree. Patient does not appear safe for PO intake.  -JJ          SLP Swallowing Diagnosis  suspected pharyngeal dysfunction  -  oral dysfunction;suspected pharyngeal dysfunction  -  severe;other (see comments) cognitive dysphagia  -JJ    Functional Impact  risk of aspiration/pneumonia  -  risk of aspiration/pneumonia;risk of malnutrition;risk of dehydration  -  risk of aspiration/pneumonia;risk of malnutrition;risk of dehydration  -JJ    Rehab Potential/Prognosis, Swallowing  adequate, monitor progress closely  -  adequate, monitor progress closely  -  adequate, monitor progress closely  -JJ    Swallow " Criteria for Skilled Therapeutic Interventions Met  demonstrates skilled criteria  -  demonstrates skilled criteria  -  demonstrates skilled criteria  -          Therapy Frequency (Swallow)  PRN  -  PRN  -  PRN  -J    Predicted Duration Therapy Intervention (Days)  until discharge  -  until discharge  -  until discharge  -    SLP Diet Recommendation  nectar thick liquids;puree with some mashed  -  puree;nectar thick liquids;ice chips between meals after oral care, with supervision  -  NPO;ice chips between meals after oral care, with supervision  -    Recommended Diagnostics  reassess via clinical swallow evaluation  -  reassess via clinical swallow evaluation  -  reassess via clinical swallow evaluation  -    Recommended Precautions and Strategies  upright posture during/after eating  -  upright posture during/after eating;small bites of food and sips of liquid;no straw;other (see comments) 1:1 ASSIST   -  --    SLP Rec. for Method of Medication Administration  meds whole;with thick liquids;with pudding or applesauce;as tolerated  -  meds whole;meds crushed;with pudding or applesauce;as tolerated  -  meds crushed;with pudding or applesauce;meds via alternate route  -    Monitor for Signs of Aspiration  yes;notify SLP if any concerns  -  yes;notify SLP if any concerns  -  yes;notify SLP if any concerns  -    Anticipated Dischage Disposition (SLP)  unknown  -  unknown  -  unknown  -          Oral Nutrition/Hydration Goal Selection (SLP)  oral nutrition/hydration, SLP goal 1  -  --  --          Oral Nutrition/Hydration Goal 1, SLP  Pt will carmen PO without overt s/s of asp.  -SH  --  --    Time Frame (Oral Nutrition/Hydration Goal 1, SLP)  by discharge  -  --  --      User Key  (r) = Recorded By, (t) = Taken By, (c) = Cosigned By    Initials Name Effective Dates    Estefanía Serna, MS CCC-SLP 06/08/18 -      Karen Mccarthy, MS CCC-SLP 03/07/18 -            EDUCATION  The patient has been educated in the following areas:   Dysphagia (Swallowing Impairment).    SLP Recommendation and Plan  SLP Swallowing Diagnosis: suspected pharyngeal dysfunction  SLP Diet Recommendation: nectar thick liquids, puree with some mashed  Recommended Precautions and Strategies: upright posture during/after eating  SLP Rec. for Method of Medication Administration: meds whole, with thick liquids, with pudding or applesauce, as tolerated     Monitor for Signs of Aspiration: yes, notify SLP if any concerns  Recommended Diagnostics: reassess via clinical swallow evaluation  Swallow Criteria for Skilled Therapeutic Interventions Met: demonstrates skilled criteria  Anticipated Dischage Disposition (SLP): unknown  Rehab Potential/Prognosis, Swallowing: adequate, monitor progress closely  Therapy Frequency (Swallow): PRN  Predicted Duration Therapy Intervention (Days): until discharge       Plan of Care Reviewed With: patient  Progress: improving  Outcome Summary: Patient seen for re eval of swallow function. Pt continues to improved. No overt s/s of aspiration with ice or spoon. Occasional throat clearing with thins via cup and straw. No overt s/s of aspiration with nectar via straw, voice clear post swallow. Double swallow with puree. Delayed cough with large bolus mech soft. Confusion persists. SLP recs continue nectar, but allow straws. Will upgrade to fork mash. Meds whole with puree or nectar. Allow free water protocol (wait 30 mins after PO, complete oral care, then allow patient sips of thins or ice).    SLP GOALS     Row Name 07/03/20 1015             Oral Nutrition/Hydration Goal 1 (SLP)    Oral Nutrition/Hydration Goal 1, SLP  Pt will carmen PO without overt s/s of asp.  -      Time Frame (Oral Nutrition/Hydration Goal 1, SLP)  by discharge  -        User Key  (r) = Recorded By, (t) = Taken By, (c) = Cosigned By    Initials Name Provider Type    Karen Delgadillo MS CCC-SLP Speech  and Language Pathologist           SLP Outcome Measures (last 72 hours)      SLP Outcome Measures     Row Name 07/03/20 1100 07/02/20 1600 07/01/20 1000       SLP Outcome Measures    Outcome Measure Used?  Adult NOMS  -SH  Adult NOMS  -SH  Adult NOMS  -JJ       Adult FCM Scores    FCM Chosen  Swallowing  -SH  Swallowing  -SH  Swallowing  -JJ    Swallowing FCM Score  4  -SH  4  -SH  1  -JJ      User Key  (r) = Recorded By, (t) = Taken By, (c) = Cosigned By    Initials Name Effective Dates    Estefanía Serna, MS CCC-SLP 06/08/18 -      Karen Mccarthy MS CCC-SLP 03/07/18 -            Time Calculation:   Time Calculation- SLP     Row Name 07/03/20 1119             Time Calculation- SLP    SLP Start Time  1015  -      SLP Received On  07/03/20  -        User Key  (r) = Recorded By, (t) = Taken By, (c) = Cosigned By    Initials Name Provider Type     Karen Mccarthy MS CCC-SLP Speech and Language Pathologist          Therapy Charges for Today     Code Description Service Date Service Provider Modifiers Qty    83889003990 HC ST TREATMENT SWALLOW 4 7/2/2020 Karen Mccarthy MS CCC-SLP GN 1    [id number] HC ST TREATMENT SWALLOW 4 7/3/2020 Karen Mccarthy MS CCC-SLP GN 1               Karen Mccarthy MS CCC-SLP  7/3/2020

## 2020-07-03 NOTE — PROGRESS NOTES
PROGRESS NOTE  Patient Name: Albert Tellez  Age/Sex: 65 y.o. male  : 1955  MRN: [id number]    Date of Admission: 2020  Date of Encounter Visit: 20   LOS: 5 days   Patient Care Team:  Debra Narayan APRN as PCP - General (Nurse Practitioner)    Chief Complaint: Drastic improvement in his confusion and withdrawal symptoms.    Hospital course: Patient came in with alcohol withdrawal, hyponatremia, thrombocytopenia,  was having significant prolonged encephalopathy but finally he came around as of 2020 and has been doing much better since.    Interval History: Patient no longer requiring restraints, he is oriented, he is alert and calm, he is on Librium and the dose was reduced and he is still too sleepy and can be reduced a little further.  He is so weak and he was supposed to go home today and check an for alcohol rehab program however when evaluated by the physical therapist he was still weak and they discharge will be on hold for now.  P.o. intake is reduced, patient denies any nausea, he just has no appetite    REVIEW OF SYSTEMS:   CONSTITUTIONAL: no fever or chills  CARDIOVASCULAR: No chest pain, chest pressure or chest discomfort. No palpitations or edema.   RESPIRATORY: No shortness of breath, cough or sputum.   GASTROINTESTINAL: No anorexia, nausea, vomiting or diarrhea. No abdominal pain or blood.   HEMATOLOGIC: No bleeding or bruising.  PSYCHIATRIC: Awake and alert,  appropriate and responsive.    Ventilator/Non-Invasive Ventilation Settings (From admission, onward)    Room air            Vital Signs  Temp:  [98.1 °F (36.7 °C)-98.8 °F (37.1 °C)] 98.1 °F (36.7 °C)  Heart Rate:  [74-91] 74  Resp:  [16-20] 18  BP: (129-153)/(71-90) 143/84  SpO2:  [92 %-98 %] 98 %  on    Device (Oxygen Therapy): room air    Intake/Output Summary (Last 24 hours) at 7/3/2020 1318  Last data filed at 7/3/2020 1313  Gross per 24 hour   Intake 720 ml   Output 75 ml   Net 645 ml     Flowsheet Rows      First  "Filed Value   Admission Height  170.2 cm (67\") Documented at 06/27/2020 2251   Admission Weight  87.7 kg (193 lb 6.4 oz) Documented at 06/28/2020 0127        Body mass index is 28.44 kg/m².      06/30/20  0551 07/01/20  0536 07/03/20  0619   Weight: 85 kg (187 lb 6.3 oz) 80 kg (176 lb 5.9 oz) 82.4 kg (181 lb 10.5 oz)       Physical Exam:  GEN:  No acute distress, awake responsive, calm and cooperative and appropriate   EYES:   Sclerae clear. No icterus. PERRL. Normal EOM  ENT:   External ears/nose normal, no oral lesions, no thrush, dry mucous membranes from oral breathing  NECK:  Supple, midline trachea, no JVD  LUNGS: Normal chest on inspection, CTAB, no wheezes. No rhonchi. No crackles. Respirations regular, even and unlabored.   CV:  Regular rhythm and rate. Normal S1/S2. No murmurs, gallops, or rubs noted.  ABD:  Soft, nontender and nondistended. Normal bowel sounds. No guarding  EXT:  Moves all extremities well, generalized weakness and unsteady gait. No cyanosis. No redness. No edema.   Skin: Dry, intact, no bleeding    Results Review:      Results from last 7 days   Lab Units 07/03/20  0325 07/02/20  0314 07/01/20  0439 06/30/20  0546 06/29/20  0402 06/28/20  0605 06/28/20  0350 06/27/20  2342   SODIUM mmol/L 137 137 129* 135* 131* 135*  --  133*   POTASSIUM mmol/L 3.3* 3.5 3.5 4.0 3.6 3.4* 3.2* 3.6   CHLORIDE mmol/L 101 103 97* 99 101 103  --  99   CO2 mmol/L 23.2 23.5 20.0* 18.3* 20.0* 22.1  --  24.8   BUN mg/dL 8 6* 6* 6* 4* 6*  --  7*   CREATININE mg/dL 0.67* 0.56* 0.61* 0.73* 0.52* 0.64*  --  0.70*   CALCIUM mg/dL 8.7 8.6 8.3* 8.7 7.7* 9.3  --  8.5*   AST (SGOT) U/L  --   --   --   --   --  118*  --  139*   ALT (SGPT) U/L  --   --   --   --   --  71*  --  79*   ANION GAP mmol/L 12.8 10.5 12.0 17.7* 10.0 9.9  --  9.2   ALBUMIN g/dL 3.30* 3.20* 3.30* 3.50  --  3.10*  --  3.70     Results from last 7 days   Lab Units 06/27/20  2342   CK TOTAL U/L 199             Results from last 7 days   Lab Units " 07/03/20  0325 07/02/20  0314 07/01/20  0439 06/30/20  0546 06/29/20  0402 06/28/20  0605 06/27/20  2342   WBC 10*3/mm3 4.09 5.53 5.51 6.12 4.77 3.61 4.34   HEMOGLOBIN g/dL 13.5 13.6 13.0 14.1 12.5* 11.7* 12.6*   HEMATOCRIT % 37.5 40.0 36.6* 39.9 35.3* 32.9* 34.8*   PLATELETS 10*3/mm3 205 150 132* 127* 86* 92* 109*   MCV fL 98.2* 103.9* 99.7* 100.5* 101.1* 100.6* 100.3*   NEUTROPHIL % %  --   --   --   --   --   --  69.9   LYMPHOCYTE % %  --   --   --   --   --   --  14.5*   MONOCYTES % %  --   --   --   --   --   --  13.1*   EOSINOPHIL % %  --   --   --   --   --   --  1.8   BASOPHIL % %  --   --   --   --   --   --  0.2   IMM GRAN % %  --   --   --   --   --   --  0.5   Ammonia level: 42      Results from last 7 days   Lab Units 06/29/20  0402 06/28/20  0350 06/27/20  2342   MAGNESIUM mg/dL 1.9 2.2 1.8           Invalid input(s): LDLCALC          Glucose   Date/Time Value Ref Range Status   07/03/2020 1140 125 70 - 130 mg/dL Final   07/03/2020 0621 98 70 - 130 mg/dL Final   07/03/2020 0045 88 70 - 130 mg/dL Final   07/02/2020 1821 103 70 - 130 mg/dL Final   07/02/2020 1129 102 70 - 130 mg/dL Final   07/01/2020 2319 114 70 - 130 mg/dL Final   07/01/2020 1632 84 70 - 130 mg/dL Final   07/01/2020 0612 67 (L) 70 - 130 mg/dL Final             Results from last 7 days   Lab Units 06/28/20  0059   NITRITE UA  Negative                   Imaging:   Imaging Results (All)     Procedure Component Value Units Date/Time       I reviewed the patient's new clinical results.  I personally viewed and interpreted the patient's imaging results: No acute disease, minimal basilar atelectasis on the left side        Medication Review:     arformoterol 15 mcg Nebulization BID - RT   chlordiazePOXIDE 25 mg Oral BID   cloNIDine 0.1 mg Oral Q8H   enoxaparin 40 mg Subcutaneous Q24H   ferrous sulfate 325 mg Oral BID With Meals   insulin lispro 0-7 Units Subcutaneous Q6H   lisinopril 10 mg Oral Q24H   pantoprazole 40 mg Oral Q AM   sodium  chloride 10 mL Intravenous Q12H            ASSESSMENT:   1. Alcohol withdrawal  2. Alcoholism  3. Acute alcoholic hepatitis  4. Thrombocytopenia  5. Acute hyponatremia  6. Acute hypokalemia  7. Anemia  8. Acute bronchitis  9. Former smoker  10. Hypoglycemia  11. 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 Hypertension  13. Immobilization syndrome    PLAN  Patient could not be discharged today after being evaluated by physical therapy because he was so weak and would  need to be placed at a skilled facility until he is able to become independent again and go home with his wife what he can join an outpatient alcohol rehab program.  He is still very drowsy and we will decrease the dose of the Librium to once a day  Follow-up on the speech therapy recommendation he is okay with thickened liquid  Blood pressure is trending better with the initiation of the lisinopril  Blood sugar is doing better and we will discontinue Accu-Cheks    Access Center note reviewed, speech therapy reviewed and case discussed with CCP, and feedback from physical therapy is appreciated    Disposition: Skilled nursing unit    Haydee Balderrama MD  07/03/20  13:18          Dictated utilizing Dragon dictation

## 2020-07-03 NOTE — THERAPY EVALUATION
Acute Care - Physical Therapy Initial Evaluation  [facility]     Patient Name: Albert Tellez  : 1955  MRN: [id number]  Today's Date: 7/3/2020      Date of Referral to PT: (P) 20         Admit Date: 2020    Visit Dx:     ICD-10-CM ICD-9-CM   1. DTs (delirium tremens) (CMS/[facility]) F10.231 291.0   2. Generalized weakness R53.1 780.79     Patient Active Problem List   Diagnosis   • Spinal stenosis of lumbar region with neurogenic claudication   • Lumbar spinal stenosis   • DTs (delirium tremens) (CMS/[facility])     Past Medical History:   Diagnosis Date   • Hearing loss     JEREMI HEARING AIDS   • History of melanoma     REMOVED FROM RIGHT TEMPLE   • Hypertension     IN THE PAST. RESOLVED. NO MEDS CURRENTLY   • Rosacea    • Skin cancer     MELANOMA REMOVED FROM RIGHT TEMPLE   • Spinal stenosis, lumbar     L3-4, L4-5     Past Surgical History:   Procedure Laterality Date   • ARM LACERATION REPAIR Left    • LUMBAR LAMINECTOMY DISCECTOMY DECOMPRESSION N/A 2018    Procedure: L3-4,4-5 laminectomy;  Surgeon: Shane Ridley MD;  Location: [facility] MAIN OR;  Service: Orthopedic Spine   • TONSILLECTOMY     • TOTAL HIP ARTHROPLASTY Left 2012   • WISDOM TOOTH EXTRACTION          PT ASSESSMENT (last 12 hours)      Physical Therapy Evaluation     Row Name 20 1252          PT Evaluation Time/Intention    Subjective Information  no complaints  (Pended)   -AP     Document Type  re-evaluation  (Pended)   -AP     Mode of Treatment  individual therapy;physical therapy  (Pended)   -AP     Patient Effort  adequate  (Pended)   -AP     Comment  Pt agreeable to PT  (Pended)   -AP     Row Name 20 1252          General Information    Patient Observations  agree to therapy;alert  (Pended)   -AP     Patient/Family Observations  Pt looked disheveled in bed with gown half off and slumped. No acute distress  (Pended)   -AP     Prior Level of Function  independent:  (Pended)   -AP     Equipment Currently Used at  "Home  none  (Pended)   -AP     Existing Precautions/Restrictions  fall  (Pended)   -AP     Benefits Reviewed  patient:  (Pended)   -AP     Row Name 07/03/20 1252          Cognitive Assessment/Intervention- PT/OT    Orientation Status (Cognition)  disoriented to;person;place  (Pended)   -AP     Follows Commands (Cognition)  delayed response/completion;increased processing time needed;initiation impaired;verbal cues/prompting required;repetition of directions required;follows one step commands;75-90% accuracy  (Pended)   -AP     Safety Deficit (Cognitive)  mild deficit;ability to follow commands  (Pended)   -AP     Cognitive Assessment/Intervention Comment  Pt asked why he was here-could not recall fall or reason for being at the hospital  (Pended)   -AP     Row Name 07/03/20 125          Safety Issues, Functional Mobility    Safety Issues Affecting Function (Mobility)  ability to follow commands;safety precaution awareness;sequencing abilities  (Pended)   -AP     Impairments Affecting Function (Mobility)  balance;coordination;cognition;endurance/activity tolerance;postural/trunk control;strength  (Pended)   -AP     Comment, Safety Issues/Impairments (Mobility)  Pt leans posteriorly and WBs on heels and stated he \"would fall if PT did not have a hold on him\".  (Pended)   -AP     Row Name 07/03/20 1252          Bed Mobility Assessment/Treatment    Bed Mobility Assessment/Treatment  bed mobility (all) activities;rolling right;supine-sit  (Pended)   -AP     [place] Level (Bed Mobility)  minimum assist (75% patient effort);moderate assist (50% patient effort);nonverbal cues (demo/gesture);verbal cues  (Pended)   -AP     Rolling Right [place] (Bed Mobility)  minimum assist (75% patient effort);nonverbal cues (demo/gesture);verbal cues  (Pended)   -AP     Supine-Sit [place] (Bed Mobility)  moderate assist (50% patient effort);nonverbal cues (demo/gesture);verbal cues  (Pended)   -AP     Bed Mobility, Safety " Issues  decreased use of arms for pushing/pulling;decreased use of legs for bridging/pushing  (Pended)   -AP     Assistive Device (Bed Mobility)  head of bed elevated  (Pended)   -AP     Comment (Bed Mobility)  Pt used SPT's hand as a bed rail to pull onto side. SPT assisted at the shoulders to lift up to sitting EOB with modA  (Pended)   -AP     Row Name 07/03/20 1252          Transfer Assessment/Treatment    Transfer Assessment/Treatment  stand pivot/stand step transfer;bed-chair transfer;sit-stand transfer;stand-sit transfer  (Pended)   -AP     Comment (Transfers)  Pt req modA x2 for standing balance. Pt needed some time to process (1 min)  (Pended)   -AP     Bed-Chair [place] (Transfers)  moderate assist (50% patient effort);2 person assist;verbal cues;nonverbal cues (demo/gesture)  (Pended)   -     Assistive Device (Bed-Chair Transfers)  walker, front-wheeled  (Pended)   -AP     Sit-Stand [place] (Transfers)  moderate assist (50% patient effort);verbal cues;nonverbal cues (demo/gesture);2 person assist  (Pended)   -AP     Stand-Sit [place] (Transfers)  minimum assist (75% patient effort);2 person assist;nonverbal cues (demo/gesture);verbal cues;stand by assist  (Pended)   -     Row Name 07/03/20 1252          Sit-Stand Transfer    Assistive Device (Sit-Stand Transfers)  walker, front-wheeled  (Pended)   -     Row Name 07/03/20 1252          Stand-Sit Transfer    Assistive Device (Stand-Sit Transfers)  walker, front-wheeled  (Pended)   -     Row Name 07/03/20 1252          Stand Pivot/Stand Step Transfer    Stand Pivot/Stand Step [place]  moderate assist (50% patient effort);nonverbal cues (demo/gesture);verbal cues;2 person assist;stand by assist  (Pended)   -     Assistive Device (Stand Pivot Stand Step Transfer)  walker, front-wheeled  (Pended)   -     Row Name 07/03/20 1252          Gait/Stairs Assessment/Training    Comment (Gait/Stairs)  Pt moved a few steps for the stand  pivot transfer. -  (Pended)   -AP     Row Name 07/03/20 1252          General ROM    GENERAL ROM COMMENTS  BLE WFL  (Pended)   -AP     Row Name 07/03/20 1252          MMT (Manual Muscle Testing)    General MMT Comments  BLE grossly weak at keast 3-/5  (Pended)   -AP     Row Name 07/03/20 1252          Motor Assessment/Intervention    Additional Documentation  Balance (Group)  (Pended)   -AP     Row Name 07/03/20 1252          Balance    Balance  static sitting balance;static standing balance  (Pended)   -AP     Row Name 07/03/20 1252          Static Sitting Balance    Level of [place] (Supported Sitting, Static Balance)  contact guard assist  (Pended)   -AP     Sitting Position (Supported Sitting, Static Balance)  sitting on edge of bed  (Pended)   -AP     Time Able to Maintain Position (Supported Sitting, Static Balance)  2 to 3 minutes  (Pended)   -AP     Row Name 07/03/20 1252          Static Standing Balance    Level of [place] (Supported Standing, Static Balance)  moderate assist, 50 to 74% patient effort;2 person assist;standby assist  (Pended)   -AP     Time Able to Maintain Position (Supported Standing, Static Balance)  2 to 3 minutes  (Pended)   -AP     Assistive Device Utilized (Supported Standing, Static Balance)  walker, rolling  (Pended)   -AP     Comment (Supported Standing, Static Balance)  Pt noted bearing weight on heels with ataxic stance 2' dts/ETOH Hx  (Pended)   -AP     Row Name 07/03/20 1252          Pain Assessment    Additional Documentation  Pain Scale: FACES Pre/Post-Treatment (Group)  (Pended)   -AP     Row Name 07/03/20 1252          Pain Scale: FACES Pre/Post-Treatment    Pain: FACES Scale, Pretreatment  0-->no hurt  (Pended)   -     Pain: FACES Scale, Post-Treatment  0-->no hurt  (Pended)   -AP     Row Name 07/03/20 1252          Plan of Care Review    Plan of Care Reviewed With  patient  (Pended)   -AP     Row Name 07/03/20 1252          Physical Therapy Clinical Impression     Date of Referral to PT  07/03/20  (Pended)   -AP     Functional Level at Time of Evaluation (PT Clinical Impression)  Independent  (Pended)   -AP     Criteria for Skilled Interventions Met (PT Clinical Impression)  treatment indicated  (Pended)   -AP     Impairments Found (describe specific impairments)  aerobic capacity/endurance;gait, locomotion, and balance;ROM;muscle performance  (Pended)   -AP     Care Plan Review (PT)  patient/other agree to care plan  (Pended)   -AP     Row Name 07/03/20 1252          Physical Therapy Goals    Bed Mobility Goal Selection (PT)  bed mobility, PT goal 1  (Pended)   -AP     Transfer Goal Selection (PT)  transfer, PT goal 1  (Pended)   -AP     Gait Training Goal Selection (PT)  gait training, PT goal 1  (Pended)   -AP     Stairs Goal Selection (PT)  stairs, PT goal 1  (Pended)   -AP     Additional Documentation  Stairs Goal Selection (PT) (Row)  (Pended)   -AP     Row Name 07/03/20 1252          Bed Mobility Goal 1 (PT)    Activity/Assistive Device (Bed Mobility Goal 1, PT)  bed mobility activities, all  (Pended)   -AP     [place] Level/Cues Needed (Bed Mobility Goal 1, PT)  independent  (Pended)   -AP     Time Frame (Bed Mobility Goal 1, PT)  1 week;long term goal (LTG)  (Pended)   -AP     Row Name 07/03/20 1252          Transfer Goal 1 (PT)    Activity/Assistive Device (Transfer Goal 1, PT)  transfers, all  (Pended)   -AP     [place] Level/Cues Needed (Transfer Goal 1, PT)  supervision required  (Pended)   -AP     Time Frame (Transfer Goal 1, PT)  long term goal (LTG);1 week  (Pended)   -AP     Row Name 07/03/20 1252          Gait Training Goal 1 (PT)    Activity/Assistive Device (Gait Training Goal 1, PT)  gait (walking locomotion);assistive device use;walker, rolling  (Pended)   -AP     [place] Level (Gait Training Goal 1, PT)  conditional independence;supervision required  (Pended)   -AP     Distance (Gait Goal 1, PT)  150  (Pended)   -AP     Time Frame  (Gait Training Goal 1, PT)  1 week;long term goal (LTG)  (Pended)   -AP     Row Name 07/03/20 1252          Stairs Goal 1 (PT)    Activity/Assistive Device (Stairs Goal 1, PT)  stairs, all skills;walker, rolling  (Pended)   -AP     [place] Level/Cues Needed (Stairs Goal 1, PT)  contact guard assist  (Pended)   -AP     Number of Stairs (Stairs Goal 1, PT)  3  (Pended)   -AP     Time Frame (Stairs Goal 1, PT)  1 week;long term goal (LTG)  (Pended)   -AP     Row Name 07/03/20 1252          Patient Education Goal (PT)    Activity (Patient Education Goal, PT)  HEP  (Pended)   -AP     [place]/Cues/Accuracy (Memory Goal 2, PT)  demonstrates adequately  (Pended)   -AP     Time Frame (Patient Education Goal, PT)  1 week;long term goal (LTG)  (Pended)   -AP     Row Name 07/03/20 1252          Positioning and Restraints    Pre-Treatment Position  in bed  (Pended)   -AP     Post Treatment Position  chair  (Pended)   -AP     In Chair  reclined;encouraged to call for assist;call light within reach;exit alarm on  (Pended)   -AP       User Key  (r) = Recorded By, (t) = Taken By, (c) = Cosigned By    Initials Name Provider Type    Dalton Stevens PT Student PT Student        Physical Therapy Education                 Title: PT OT SLP Therapies (In Progress)     Topic: Physical Therapy (In Progress)     Point: Mobility training (Done)     Description:   Instruct learner(s) on safety and technique for assisting patient out of bed, chair or wheelchair.  Instruct in the proper use of assistive devices, such as walker, crutches, cane or brace.              Patient Friendly Description:   It's important to get you on your feet again, but we need to do so in a way that is safe for you. Falling has serious consequences, and your personal safety is the most important thing of all.        When it's time to get out of bed, one of us or a family member will sit next to you on the bed to give you support.     If your doctor or nurse  tells you to use a walker, crutches, a cane, or a brace, be sure you use it every time you get out of bed, even if you think you don't need it.    Learning Progress Summary           Patient Acceptance, E,D, NR,DU by AP at 7/3/2020 1251                   Point: Home exercise program (Not Started)     Description:   Instruct learner(s) on appropriate technique for monitoring, assisting and/or progressing patient with therapeutic exercises and activities.              Learner Progress:   Not documented in this visit.          Point: Body mechanics (Done)     Description:   Instruct learner(s) on proper positioning and spine alignment for patient and/or caregiver during mobility tasks and/or exercises.              Learning Progress Summary           Patient Acceptance, E,D, NR,DU by AP at 7/3/2020 1251                   Point: Precautions (Done)     Description:   Instruct learner(s) on prescribed precautions during mobility and gait tasks              Learning Progress Summary           Patient Acceptance, E,D, NR,DU by  at 7/3/2020 1251                               User Key     Initials Effective Dates Name Provider Type Discipline     06/23/20 -  Dalton Ward, PT Student PT Student PT              PT Recommendation and Plan  Anticipated Discharge Disposition (PT): (P) other (see comments)(?CMU)  Therapy Frequency (PT Clinical Impression): (P) daily  Outcome Summary/Treatment Plan (PT)  Anticipated Discharge Disposition (PT): (P) other (see comments)(?CMU)  Plan of Care Reviewed With: (P) patient  Outcome Summary: (P) Pt agreeable to PT today. Pt req Nini using SPT hand to pull to the R side of bed and modA with help at the shoulder to lift up to sitting EOB. Pt req modA x2 to STS and stated if PT did not hold him he would fall back. Pt noted WB on heels with toes off the floor and no awareness to JOANNE (wide) and ataxic stance. Pt asked to sit back down and took a while to process the instruction (30s-60s) until  sitting back down. Pt stated frustration that he was independent prior to hosp but could not recall why he was in the hospital. Pt req modA x2 for Stand Pivot transfer from bed to chair and for stand to sit. Pt slightly agitated and not very responsive (difficult for pt to hear through masks). Pt will benefit from continued skilled PT in order to improve safe and functional mobility to baseline-D/C to ?CMU to transition to ETOH abuse rehab facility  Outcome Measures     Row Name 07/03/20 1200             How much help from another person do you currently need...    Turning from your back to your side while in flat bed without using bedrails?  3  (Pended)   -AP      Moving from lying on back to sitting on the side of a flat bed without bedrails?  2  (Pended)   -AP      Moving to and from a bed to a chair (including a wheelchair)?  2  (Pended)   -AP      Standing up from a chair using your arms (e.g., wheelchair, bedside chair)?  2  (Pended)   -AP      Climbing 3-5 steps with a railing?  1  (Pended)   -AP      To walk in hospital room?  2  (Pended)   -AP      AM-PAC 6 Clicks Score (PT)  12  (Pended)   -AP         Functional Assessment    Outcome Measure Options  AM-PAC 6 Clicks Basic Mobility (PT)  (Pended)   -AP        User Key  (r) = Recorded By, (t) = Taken By, (c) = Cosigned By    Initials Name Provider Type    AP Dalton Ward, PT Student PT Student         Time Calculation:   PT Charges     Row Name 07/03/20 1315             Time Calculation    Start Time  1228  (Pended)   -AP      Stop Time  1245  (Pended)   -AP      Time Calculation (min)  17 min  (Pended)   -AP      PT Received On  07/03/20  (Pended)   -AP      PT - Next Appointment  07/04/20  (Pended)   -AP      PT Goal Re-Cert Due Date  07/10/20  (Pended)   -AP         Time Calculation- PT    Total Timed Code Minutes- PT  14 minute(s)  (Pended)   -AP        User Key  (r) = Recorded By, (t) = Taken By, (c) = Cosigned By    Initials Name Provider Type    AP  Dalton Ward, PT Student PT Student        Therapy Charges for Today     Code Description Service Date Service Provider Modifiers Qty    67157763320 HC PT EVAL MOD COMPLEXITY 2 7/3/2020 Dalton Ward, PT Student GP 1    73032664379 HC PT THER PROC EA 15 MIN 7/3/2020 Dalton Ward, PT Student GP 1    60159049056 HC PT THER SUPP EA 15 MIN 7/3/2020 Dalton Ward, PT Student GP 1          PT G-Codes  Outcome Measure Options: (P) AM-PAC 6 Clicks Basic Mobility (PT)  AM-PAC 6 Clicks Score (PT): (P) 12      Dalton Ward PT Student  7/3/2020

## 2020-07-03 NOTE — PROGRESS NOTES
"Continued Stay Note  [facility]     Patient Name: Albert Tellez  MRN: [id number]  Today's Date: 7/3/2020    Admit Date: 6/27/2020    Discharge Plan     Row Name 07/03/20 1427       Plan    Plan  Skilled rehab vs ETOH inpatient rehab    Plan Comments  Met with patient and Spouse Fabienne at bedside.  Asked Access to join the conversation for ETOH inpatient services and information.  Spouse wanted patient discharged today.  Patient was able to work with PT today and discussed patient would need a precert for skilled rehab with an accepting facility.  Patient's spouse stated she \"didn't care where he goes\" and agreeable to multi referrals.  Explained that patient has improved each day and may not qualify by  Monday.  And will also ask for OT orders for evaluation.  Multi referrals are pending.  She states she would prefer that patient return to [facility] for ETOH treatment.  Explained that CCP can not transfer the patient there.  He would need to be discharged medically and she could take him there.  And she would need to take the patient to [facility] for treatment and be evaluated in the ER per access instructions. Referrals are pending and will need [place] pre-cert for subacute skilled rehab.........................Chelly Sorto RN         Discharge Codes    No documentation.             Chelly Sorto RN    "

## 2020-07-03 NOTE — PLAN OF CARE
Problem: Patient Care Overview  Goal: Plan of Care Review  Outcome: Ongoing (interventions implemented as appropriate)  Flowsheets  Taken 7/2/2020 1348 by Karen Mccarthy MS CCC-SLP  Progress: improving  Outcome Summary: Patient seen for re eval of swallow function. No overt s/s of aspiration with ice, nectar via cup, or puree. Immediate cough with thins via spoon and strained mech soft. Throat clearing with nectar via straw. SLP recs nectar, no straws, and puree. Meds whole in puree/pudding. Allow free water protocol with ice.  Taken 7/3/2020 0256 by Suki Dawson, RN  Plan of Care Reviewed With: patient

## 2020-07-03 NOTE — PROGRESS NOTES
Palliative Consult cancelled due to patient's improvement. Palliative Care briefly followed up with patient and wife for support given scheduled meeting prior to patient's drastic improvement. Patient's RN also present at time of discussion. Wife and patient trying to determine long term options for placement and treatment. They would like to speak to MD together and also talk with CCP. RN paging MD and CCP notified.

## 2020-07-03 NOTE — PLAN OF CARE
Problem: Patient Care Overview  Goal: Plan of Care Review  Outcome: Ongoing (interventions implemented as appropriate)  Flowsheets (Taken 7/3/2020 1700)  Progress: improving  Plan of Care Reviewed With: patient  Outcome Summary: VSS, access met c/ pt, ST re-eval pt & upgraded pt's diet, worked c/ PT, up in chair, assist x 2 to ambulate to BR,  CCP assisting c/ referrals to skilled rehab, hopefully DC soon, CTM

## 2020-07-03 NOTE — DISCHARGE PLACEMENT REQUEST
"Albert Tellez (65 y.o. Male)     Date of Birth Social Security Number Address Home Phone MRN    1955  [address] [id number] [phone] [id number]    Nondenominational Marital Status          Patient Refused        Admission Date Admission Type Admitting Provider Attending Provider Department, Room/Bed    6/27/20 Emergency Indio Chin MD Saad, Lebnan S, MD [address], [address]    Discharge Date Discharge Disposition Discharge Destination                       Attending Provider:  Haydee Balderrama MD    Allergies:  No Known Allergies    Isolation:  None   Infection:  None   Code Status:  No CPR    Ht:  170.2 cm (67.01\")   Wt:  82.4 kg (181 lb 10.5 oz)    Admission Cmt:  None   Principal Problem:  None                Active Insurance as of 6/27/2020     Primary Coverage     Payor Plan Insurance Group Employer/Plan Group    [employer] BLUE CROSS [employer] BLUE CROSS BLUE SHIELD PPO [id number]     Payor Plan Address Payor Plan Phone Number Payor Plan Fax Number Effective Dates    [address] [phone]  7/1/2017 - None Entered    [facility] [address]       Subscriber Name Subscriber Birth Date Member ID       DAYNA TELLEZ 2/8/1958 [id number]                 Emergency Contacts      (Rel.) Home Phone Work Phone Mobile Phone    Dayna Tellez (Spouse) [phone] -- --            "

## 2020-07-03 NOTE — PLAN OF CARE
Problem: Patient Care Overview  Goal: Plan of Care Review  Flowsheets  Taken 7/3/2020 1252  Plan of Care Reviewed With: patient (Pended)  Taken 7/3/2020 1316  Outcome Summary: Pt 66 yo male presents with generalized weakness 2' to DTs. Pt agreeable to PT today. Pt req Nini using SPT hand to pull to the R side of bed and modA with help at the shoulder to lift up to sitting EOB. Pt req modA x2 to STS and stated if PT did not hold him he would fall back. Pt noted WB on heels with toes off the floor and no awareness to JOANNE (wide) and ataxic stance. Pt asked to sit back down and took a while to process the instruction (30s-60s) until sitting back down. Pt stated frustration that he was independent prior to hosp but could not recall why he was in the hospital. Pt req modA x2 for Stand Pivot transfer from bed to chair and for stand to sit. Pt slightly agitated and not very responsive (difficult for pt to hear through masks). Pt will benefit from continued skilled PT in order to improve safe and functional mobility to baseline-D/C to ?CMU to transition to ETOH abuse rehab facility (Pended)  ..Patient was wearing a face mask during this therapy encounter. Therapist used appropriate personal protective equipment including mask and gloves.  Mask used was standard procedure mask. Appropriate PPE was worn during the entire therapy session. Hand hygiene was completed before and after therapy session. Patient is not in enhanced droplet precautions.

## 2020-07-03 NOTE — CONSULTS
"[facility] consulted on pt r/t excessive alcohol use.     Pt was transferred to [facility] 6/27 from [facility] where he was admitted for alcohol detox. Pt had an unwitnessed fall at that facility and was sent to [facility]. Pt presented with AMS, abnormal labs, and was noted to be in DTs. During course of hospitalization, pt did require precedex gtt and restraints d/t agitation, and has continued on CIWA protocol. Pt has not required restraints since removal on 07/01.  attempted to see pt yesterday but he was too lethargic. Last documented CIWA score at 0017 is 11, however, dayshift RN states that pt's recent scores have been 5 and pt has not required PRN Ativan recently. Per chart, pt has not been eating and palliative is to meet with pt's wife to discuss plan of care.     Upon approach pt sleeping but was easily roused. Pt appeared somewhat irritable but was agreeable to assessment. Pt oriented x3 with confusion as to date. Pt states that he remembers he was at [facility] for alcohol detox but does not remember falling. Pt states that he has been  to his wife, Fabienne Tellez, for 38 years. He states that they have no children and have lived in the [place] area for 24 years. 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 Pt states that he has been fired from companies at least twice d/t \"not getting along\" with management.     Pt states that he has been drinking for 50 years. He states that he was a \"party boy\" throughout his teen years and into college. He appears to downplay his alcohol use and states that he feels he has never been a heavy drinker and was \"never one to pass out at noon\". He confirms that he drinks around a pint of vodka/day yet states that \"compared to other people I know I'm an average drinker\". He states that he had tx for alcohol use at [facility] a few years ago but was only there " "three days and only went d/t perceived pressure from his wife. He states that his hospitalization at [facility] has been d/t pressure from his wife as well. Pt did voice that he didn't want to drink anymore, \"which was never the case before\". He states that he has had \"personal crises\" which have caused him to reflect and realize that he wants more out of life. He states that he had two uncles that  horrible deaths d/t alcoholism and he does not want to end up like them.     Pt additionally voices that he has a hx of anxiety and depression. Pt states that he had SI two years ago but \"not seriously\". He states that this was at a time when his wife was considering divorce. Pt states that he has been a pt of psychologist Dr. Roberta Prescott in the past which was very helpful. He states he has not been seen by him recently. He states that he has also been on Wellbutrin in the past, but it was not helpful for him. He currently denies anxiety, depression, and SI/HI.     Pt agreeable to return to [facility] or go to [facility]. Pt voiced interest in therapists/psychiatrist in the area. Psychiatric resources provided to pt. As previously stated, palliative care to meet with wife today to discuss care. Pt continues to exhibit poor appetite, but stated that this was a good thing for him because he \"normally eats too much\". Pt did request thickened coffee, which this RN provided to pt.     AC will continue to follow.   "

## 2020-07-04 LAB
ALBUMIN SERPL-MCNC: 3.4 G/DL (ref 3.5–5.2)
ANION GAP SERPL CALCULATED.3IONS-SCNC: 8.5 MMOL/L (ref 5–15)
BUN SERPL-MCNC: 6 MG/DL (ref 8–23)
BUN/CREAT SERPL: 9 (ref 7–25)
CALCIUM SPEC-SCNC: 9.2 MG/DL (ref 8.6–10.5)
CHLORIDE SERPL-SCNC: 104 MMOL/L (ref 98–107)
CO2 SERPL-SCNC: 22.5 MMOL/L (ref 22–29)
CREAT SERPL-MCNC: 0.67 MG/DL (ref 0.76–1.27)
GFR SERPL CREATININE-BSD FRML MDRD: 119 ML/MIN/1.73
GLUCOSE SERPL-MCNC: 96 MG/DL (ref 65–99)
PHOSPHATE SERPL-MCNC: 2.9 MG/DL (ref 2.5–4.5)
POTASSIUM SERPL-SCNC: 3.8 MMOL/L (ref 3.5–5.2)
SODIUM SERPL-SCNC: 135 MMOL/L (ref 136–145)

## 2020-07-04 PROCEDURE — 94799 UNLISTED PULMONARY SVC/PX: CPT

## 2020-07-04 PROCEDURE — 25010000002 ENOXAPARIN PER 10 MG: Performed by: INTERNAL MEDICINE

## 2020-07-04 PROCEDURE — 25010000002 LORAZEPAM PER 2 MG: Performed by: INTERNAL MEDICINE

## 2020-07-04 PROCEDURE — 80069 RENAL FUNCTION PANEL: CPT | Performed by: INTERNAL MEDICINE

## 2020-07-04 PROCEDURE — 97530 THERAPEUTIC ACTIVITIES: CPT

## 2020-07-04 RX ORDER — CHLORDIAZEPOXIDE HYDROCHLORIDE 5 MG/1
5 CAPSULE, GELATIN COATED ORAL EVERY MORNING
Status: COMPLETED | OUTPATIENT
Start: 2020-07-05 | End: 2020-07-05

## 2020-07-04 RX ADMIN — SODIUM CHLORIDE, PRESERVATIVE FREE 10 ML: 5 INJECTION INTRAVENOUS at 09:18

## 2020-07-04 RX ADMIN — PANTOPRAZOLE SODIUM 40 MG: 40 TABLET, DELAYED RELEASE ORAL at 06:27

## 2020-07-04 RX ADMIN — LISINOPRIL 10 MG: 10 TABLET ORAL at 09:18

## 2020-07-04 RX ADMIN — ARFORMOTEROL TARTRATE 15 MCG: 15 SOLUTION RESPIRATORY (INHALATION) at 07:05

## 2020-07-04 RX ADMIN — ENOXAPARIN SODIUM 40 MG: 40 INJECTION SUBCUTANEOUS at 06:27

## 2020-07-04 RX ADMIN — LORAZEPAM 2 MG: 2 INJECTION INTRAMUSCULAR; INTRAVENOUS at 20:03

## 2020-07-04 RX ADMIN — FERROUS SULFATE TAB 325 MG (65 MG ELEMENTAL FE) 325 MG: 325 (65 FE) TAB at 09:18

## 2020-07-04 RX ADMIN — FERROUS SULFATE TAB 325 MG (65 MG ELEMENTAL FE) 325 MG: 325 (65 FE) TAB at 18:08

## 2020-07-04 RX ADMIN — CHLORDIAZEPOXIDE HYDROCHLORIDE 25 MG: 25 CAPSULE ORAL at 06:27

## 2020-07-04 RX ADMIN — LORAZEPAM 2 MG: 2 INJECTION INTRAMUSCULAR; INTRAVENOUS at 23:02

## 2020-07-04 RX ADMIN — LORAZEPAM 1 MG: 2 INJECTION INTRAMUSCULAR; INTRAVENOUS at 06:28

## 2020-07-04 RX ADMIN — LORAZEPAM 2 MG: 2 INJECTION INTRAMUSCULAR; INTRAVENOUS at 21:06

## 2020-07-04 RX ADMIN — ARFORMOTEROL TARTRATE 15 MCG: 15 SOLUTION RESPIRATORY (INHALATION) at 20:04

## 2020-07-04 RX ADMIN — CLONIDINE HYDROCHLORIDE 0.1 MG: 0.1 TABLET ORAL at 23:01

## 2020-07-04 RX ADMIN — CLONIDINE HYDROCHLORIDE 0.1 MG: 0.1 TABLET ORAL at 15:05

## 2020-07-04 RX ADMIN — CLONIDINE HYDROCHLORIDE 0.1 MG: 0.1 TABLET ORAL at 06:27

## 2020-07-04 NOTE — NURSING NOTE
"1600: pt's wife called unit asking for update re pt's DC plan & skilled nursing facilities referrals. Updated wife re my knowledge of DC process to alcohol treatment facilities - access center & CCP provide list of resources but pt, pt's family, or pt's friends coordinate their admission there. Pt's are DC from the hospital when their MD states they are medically stable & then it is recommended that they go straight to alcohol treatment facility. Wife stated, \"she was informed of this yesterday but she is completely astonished at the lack of help & under-whelmed by the support offered.\" Reassured pt we want the best for her  & to support his sobriety. Wife stated, \"that her  was admitted from [place] where she had just called & got him in, so why can't we just do that for her?\" Repeatedly stated she was dissatisfied with access center and case managements lack of assistance.    1610: SHIREEN Dunbar CCP & she verified the above information. She looked at pt's chart & informed me that pt has [facility] insurance. They are closed due to the holiday so pt will not obtain precert this weekend.     1626: Returned call to pt's wife & re-iterated the above information & update from COLIN Dunbar. Pt's wife stated,\"that is just perfect. & [facility] may be calling the unit in regards to needing a covid test.\"  "

## 2020-07-04 NOTE — PROGRESS NOTES
Aneudy Caraballo MD                          [phone]      Patient ID:    Name:  Albert Tellez    MRN:  [id number]    1955   65 y.o.  male            Patient Care Team:  Debra Narayan APRN as PCP - General (Nurse Practitioner)    CC/ Reason for visit: Acute alcohol withdrawal syndrome, alcoholic hepatitis, weak    Subjective: Pt seen and examined this AM. No acute overnight events noted. Doing better.  Still confused and having a tough time finishing a sentence.  Working with physical therapy but noted to be significantly weak    ROS: Unable to obtain due to confusion    Objective     Vital Signs past 24hrs    BP range: BP: (112-161)/(60-88) 118/76  Pulse range: Heart Rate:  [77-98] 98  Resp rate range: Resp:  [16-20] 16  Temp range: Temp (24hrs), Av °F (36.7 °C), Min:97.6 °F (36.4 °C), Max:98.3 °F (36.8 °C)      Ventilator/Non-Invasive Ventilation Settings (From admission, onward)    None          Device (Oxygen Therapy): room air       81.6 kg (179 lb 14.4 oz); Body mass index is 28.17 kg/m².      Intake/Output Summary (Last 24 hours) at 2020 1811  Last data filed at 2020 1511  Gross per 24 hour   Intake 120 ml   Output 1250 ml   Net -1130 ml       PHYSICAL EXAM   Constitutional: Middle aged pt in bed, No acute respiratory distress, no accessory muscle use  Head: - NCAT  Eyes: No pallor.  Anicteric sclerae, EOMI.  ENMT:  Mallampati 4, no oral thrush. Dry MM.   NECK: Trachea midline, No thyromegaly, no palpable cervical lymphadenopathy  Heart: RRR, no murmur. No pedal edema   Lungs: SATHYA +, No wheezes/ crackles heard    Abdomen: Soft. No tenderness, guarding or rigidity. No palpable masses  Extremities: Extremities warm and well perfused. No cyanosis/ clubbing  Neuro: Conscious, confused and severe hardness of hearing, no gross focal neuro deficits  Psych: Mood and affect -sleepy and confused    Scheduled meds:    arformoterol 15 mcg Nebulization BID - RT      chlordiazePOXIDE 25 mg Oral QAM   cloNIDine 0.1 mg Oral Q8H   enoxaparin 40 mg Subcutaneous Q24H   ferrous sulfate 325 mg Oral BID With Meals   lisinopril 10 mg Oral Q24H   pantoprazole 40 mg Oral Q AM   sodium chloride 10 mL Intravenous Q12H       IV meds:                           Data Review:      Results from last 7 days   Lab Units 07/04/20  0344 07/03/20  1634 07/03/20  0325 07/02/20  0314 07/01/20  0439  06/28/20  0605  06/27/20  2342   SODIUM mmol/L 135*  --  137 137 129*   < > 135*  --  133*   POTASSIUM mmol/L 3.8 3.7 3.3* 3.5 3.5   < > 3.4*   < > 3.6   CHLORIDE mmol/L 104  --  101 103 97*   < > 103  --  99   CO2 mmol/L 22.5  --  23.2 23.5 20.0*   < > 22.1  --  24.8   BUN mg/dL 6*  --  8 6* 6*   < > 6*  --  7*   CREATININE mg/dL 0.67*  --  0.67* 0.56* 0.61*   < > 0.64*  --  0.70*   CALCIUM mg/dL 9.2  --  8.7 8.6 8.3*   < > 9.3  --  8.5*   BILIRUBIN mg/dL  --   --   --   --   --   --  0.7  --  0.7   ALK PHOS U/L  --   --   --   --   --   --  72  --  104   ALT (SGPT) U/L  --   --   --   --   --   --  71*  --  79*   AST (SGOT) U/L  --   --   --   --   --   --  118*  --  139*   GLUCOSE mg/dL 96  --  85 109* 64*   < > 86  --  86   WBC 10*3/mm3  --   --  4.09 5.53 5.51   < > 3.61  --  4.34   HEMOGLOBIN g/dL  --   --  13.5 13.6 13.0   < > 11.7*  --  12.6*   PLATELETS 10*3/mm3  --   --  205 150 132*   < > 92*  --  109*    < > = values in this interval not displayed.       Lab Results   Component Value Date    CALCIUM 9.2 07/04/2020    PHOS 2.9 07/04/2020                    Results Review:    I have reviewed the relevant laboratory results and independently reviewed the chest imaging from this hospitalization including the available echocardiogram reports personally and summarized it if/ when appropriate below    Assessment    Acute metabolic encephalopathy  Acute alcohol withdrawal syndrome  Alcohol dependence/abuse  Acute alcoholic hepatitis  Acute hyponatremia  Acute hypokalemia  Anemia  Acute  bronchitis  Former smoker  Hypertension    PLAN:  Patient making slow progress with regards to his alcohol withdrawal.  Will continue taper medications and continue with CIWA protocol.  Fall precautions.  Physical therapy evaluation to help in discharge placement  Speech therapy recommendations.  Blood pressure well controlled with current medication.    Awaiting bed evaluate at alcohol rehab program and insurance approval    I have discussed my findings and recommendations with patient.     Aneudy Caraballo MD  7/4/2020

## 2020-07-04 NOTE — PLAN OF CARE
Problem: Patient Care Overview  Goal: Plan of Care Review  Flowsheets (Taken 7/4/2020 1121)  Outcome Summary: Pt required decreased physical assist to come to stand and during bed mobility, ambulation limited by pt safety and difficulty with processing, although improving. Pt continues to be a good candidate for acute PT.   Pt and therapist were wearing face masks throughout treatment, hand hygiene performed before and after treatment. Pt not in enhanced droplet precautions.

## 2020-07-04 NOTE — THERAPY TREATMENT NOTE
Patient Name: Albert Tellez  : 1955    MRN: [id number]                              Today's Date: 2020       Admit Date: 2020    Visit Dx:     ICD-10-CM ICD-9-CM   1. DTs (delirium tremens) (CMS/[facility]) F10.231 291.0   2. Generalized weakness R53.1 780.79     Patient Active Problem List   Diagnosis   • Spinal stenosis of lumbar region with neurogenic claudication   • Lumbar spinal stenosis   • DTs (delirium tremens) (CMS/[facility])     Past Medical History:   Diagnosis Date   • Hearing loss     JEREMI HEARING AIDS   • History of melanoma     REMOVED FROM RIGHT TEMPLE   • Hypertension     IN THE PAST. RESOLVED. NO MEDS CURRENTLY   • Rosacea    • Skin cancer     MELANOMA REMOVED FROM RIGHT TEMPLE   • Spinal stenosis, lumbar     L3-4, L4-5     Past Surgical History:   Procedure Laterality Date   • ARM LACERATION REPAIR Left    • LUMBAR LAMINECTOMY DISCECTOMY DECOMPRESSION N/A 2018    Procedure: L3-4,4-5 laminectomy;  Surgeon: Shane Ridley MD;  Location: [facility];  Service: Orthopedic Spine   • TONSILLECTOMY     • TOTAL HIP ARTHROPLASTY Left 2012   • WISDOM TOOTH EXTRACTION       General Information     Row Name 20 1118          PT Evaluation Time/Intention    Document Type  therapy note (daily note)  -RS     Mode of Treatment  individual therapy;physical therapy  -RS     Row Name 20 1118          General Information    Patient Profile Reviewed?  yes  -RS     Existing Precautions/Restrictions  fall  -RS       User Key  (r) = Recorded By, (t) = Taken By, (c) = Cosigned By    Initials Name Provider Type    RS Keiry Lowe, PT Physical Therapist        Mobility     Row Name 20 1119          Bed Mobility Assessment/Treatment    Bed Mobility Assessment/Treatment  supine-sit;sit-supine  -RS     Supine-Sit [place] (Bed Mobility)  nonverbal cues (demo/gesture);verbal cues;minimum assist (75% patient effort)  -RS     Sit-Supine [place] (Bed Mobility)  moderate  assist (50% patient effort);1 person assist;verbal cues;nonverbal cues (demo/gesture)  -RS     Assistive Device (Bed Mobility)  bed rails;head of bed elevated  -RS     Row Name 07/04/20 1119          Transfer Assessment/Treatment    Comment (Transfers)  Peter with RW for STS from EOB, performed STS x3  -RS     Row Name 07/04/20 1119          Sit-Stand Transfer    Sit-Stand [place] (Transfers)  verbal cues;nonverbal cues (demo/gesture);minimum assist (75% patient effort);1 person assist  -RS     Assistive Device (Sit-Stand Transfers)  walker, front-wheeled  -RS     Row Name 07/04/20 1119          Gait/Stairs Assessment/Training    Comment (Gait/Stairs)  Pt took 10 total marching steps at bedside, pt with much difficulty clearing R foot, difficulty following commands/decreased safety awareness.  -RS       User Key  (r) = Recorded By, (t) = Taken By, (c) = Cosigned By    Initials Name Provider Type    Keiry Lazcano PT Physical Therapist        Obj/Interventions     Row Name 07/04/20 1121          Therapeutic Exercise    Comment (Therapeutic Exercise)  LAQ, standing march, AP x10 ea  -RS       User Key  (r) = Recorded By, (t) = Taken By, (c) = Cosigned By    Initials Name Provider Type    Keiry Lazcano PT Physical Therapist        Goals/Plan    No documentation.       Clinical Impression     Row Name 07/04/20 1121          Pain Scale: FACES Pre/Post-Treatment    Pain: FACES Scale, Pretreatment  0-->no hurt  -RS     Pain: FACES Scale, Post-Treatment  0-->no hurt  -RS     Row Name 07/04/20 1121          Plan of Care Review    Plan of Care Reviewed With  patient  -RS     Progress  improving  -RS     Outcome Summary  Pt required decreased physical assist to come to stand and during bed mobility, ambulation limited by pt safety and difficulty with processing, although improving. Pt continues to be a good candidate for acute PT.  -RS     Row Name 07/04/20 1121          Positioning and Restraints     Pre-Treatment Position  in bed  -RS     Post Treatment Position  bed  -RS     In Bed  call light within reach;encouraged to call for assist;exit alarm on  -RS       User Key  (r) = Recorded By, (t) = Taken By, (c) = Cosigned By    Initials Name Provider Type    Keiry Lazcano PT Physical Therapist        Outcome Measures     Row Name 07/04/20 1123          How much help from another person do you currently need...    Turning from your back to your side while in flat bed without using bedrails?  3  -RS     Moving from lying on back to sitting on the side of a flat bed without bedrails?  3  -RS     Moving to and from a bed to a chair (including a wheelchair)?  2  -RS     Standing up from a chair using your arms (e.g., wheelchair, bedside chair)?  3  -RS     Climbing 3-5 steps with a railing?  1  -RS     To walk in hospital room?  2  -RS     AM-PAC 6 Clicks Score (PT)  14  -RS     Row Name 07/04/20 1123          Functional Assessment    Outcome Measure Options  AM-PAC 6 Clicks Basic Mobility (PT)  -RS       User Key  (r) = Recorded By, (t) = Taken By, (c) = Cosigned By    Initials Name Provider Type    Keiry Lazcano PT Physical Therapist        Physical Therapy Education                 Title: PT OT SLP Therapies (In Progress)     Topic: Physical Therapy (In Progress)     Point: Mobility training (In Progress)     Description:   Instruct learner(s) on safety and technique for assisting patient out of bed, chair or wheelchair.  Instruct in the proper use of assistive devices, such as walker, crutches, cane or brace.              Patient Friendly Description:   It's important to get you on your feet again, but we need to do so in a way that is safe for you. Falling has serious consequences, and your personal safety is the most important thing of all.        When it's time to get out of bed, one of us or a family member will sit next to you on the bed to give you support.     If your doctor or nurse tells  you to use a walker, crutches, a cane, or a brace, be sure you use it every time you get out of bed, even if you think you don't need it.    Learning Progress Summary           Patient Acceptance, E,D, NR by RS at 7/4/2020 1123    Acceptance, E,D, NR,DU by AP at 7/3/2020 1251                   Point: Home exercise program (In Progress)     Description:   Instruct learner(s) on appropriate technique for monitoring, assisting and/or progressing patient with therapeutic exercises and activities.              Learning Progress Summary           Patient Acceptance, E,D, NR by RS at 7/4/2020 1123                   Point: Body mechanics (In Progress)     Description:   Instruct learner(s) on proper positioning and spine alignment for patient and/or caregiver during mobility tasks and/or exercises.              Learning Progress Summary           Patient Acceptance, E,D, NR by RS at 7/4/2020 1123    Acceptance, E,D, NR,DU by AP at 7/3/2020 1251                   Point: Precautions (In Progress)     Description:   Instruct learner(s) on prescribed precautions during mobility and gait tasks              Learning Progress Summary           Patient Acceptance, E,D, NR by RS at 7/4/2020 1123    Acceptance, E,D, NR,DU by AP at 7/3/2020 1251                               User Key     Initials Effective Dates Name Provider Type Discipline     04/03/19 -  Keiry Lowe, PT Physical Therapist PT    AP 06/23/20 -  Dalton Ward PT Student PT Student PT              PT Recommendation and Plan     Plan of Care Reviewed With: patient  Progress: improving  Outcome Summary: Pt required decreased physical assist to come to stand and during bed mobility, ambulation limited by pt safety and difficulty with processing, although improving. Pt continues to be a good candidate for acute PT.     Time Calculation:   PT Charges     Row Name 07/04/20 1124             Time Calculation    Start Time  1108  -RS      Stop Time  1118  -RS      Time  Calculation (min)  10 min  -RS      PT - Next Appointment  07/06/20  -RS        User Key  (r) = Recorded By, (t) = Taken By, (c) = Cosigned By    Initials Name Provider Type    Keiry Lazcano PT Physical Therapist        Therapy Charges for Today     Code Description Service Date Service Provider Modifiers Qty    [id number]  PT THERAPEUTIC ACT EA 15 MIN 7/4/2020 Keiry Lowe, PT GP 1          PT G-Codes  Outcome Measure Options: AM-PAC 6 Clicks Basic Mobility (PT)  AM-PAC 6 Clicks Score (PT): 14    Keiry Lowe PT  7/4/2020

## 2020-07-04 NOTE — PROGRESS NOTES
[facility] Note.    Chart reviewed.     On approach, Pt was on telephone with someone. Waited briefly and Pt would not end his phone call.  Spoke to RN, Radha. Per chart documentation, Pt has entered ETOH rehab out of a desire by his wife previously, including recent admission to [facility].   Per RN, Pt is to return to [facility] at discharge.   Nothing further Access can offer. Access will sign off.

## 2020-07-04 NOTE — PLAN OF CARE
-VSS, no acute events  -Pt oriented x2-3; content of speech mostly illogical   -Repeatedly setting off bed alarm by sitting up on side of bed  -CIWA 0 all night

## 2020-07-04 NOTE — NURSING NOTE
Letitia c/ [facility] called to update me. [facility] can be reached at [phone]. Stated they can help assist pt c/ coordinating a ride at [place], pt does not need a covid test, and can be using a walker to ambulate as long as he is independent c/ ADLs.

## 2020-07-04 NOTE — PLAN OF CARE
Problem: Patient Care Overview  Goal: Plan of Care Review  Outcome: Ongoing (interventions implemented as appropriate)  Flowsheets (Taken 7/4/2020 4828)  Progress: improving  Plan of Care Reviewed With: patient; spouse  Outcome Summary: VSS, pt CIWA, ambulated c/ PT & up c/ nursing staff today, up in chair, mobility improving, access met c/ pt again, plan to DC to [facility], CTM

## 2020-07-05 LAB
ALBUMIN SERPL-MCNC: 3.4 G/DL (ref 3.5–5.2)
ANION GAP SERPL CALCULATED.3IONS-SCNC: 10.9 MMOL/L (ref 5–15)
BUN SERPL-MCNC: 5 MG/DL (ref 8–23)
BUN/CREAT SERPL: 8.3 (ref 7–25)
CALCIUM SPEC-SCNC: 8.9 MG/DL (ref 8.6–10.5)
CHLORIDE SERPL-SCNC: 106 MMOL/L (ref 98–107)
CO2 SERPL-SCNC: 23.1 MMOL/L (ref 22–29)
CREAT SERPL-MCNC: 0.6 MG/DL (ref 0.76–1.27)
GFR SERPL CREATININE-BSD FRML MDRD: 136 ML/MIN/1.73
GLUCOSE SERPL-MCNC: 95 MG/DL (ref 65–99)
PHOSPHATE SERPL-MCNC: 3.4 MG/DL (ref 2.5–4.5)
POTASSIUM SERPL-SCNC: 3.4 MMOL/L (ref 3.5–5.2)
SODIUM SERPL-SCNC: 140 MMOL/L (ref 136–145)

## 2020-07-05 PROCEDURE — 80069 RENAL FUNCTION PANEL: CPT | Performed by: INTERNAL MEDICINE

## 2020-07-05 PROCEDURE — 25010000002 LORAZEPAM PER 2 MG: Performed by: INTERNAL MEDICINE

## 2020-07-05 PROCEDURE — 25010000002 ENOXAPARIN PER 10 MG: Performed by: INTERNAL MEDICINE

## 2020-07-05 PROCEDURE — 94799 UNLISTED PULMONARY SVC/PX: CPT

## 2020-07-05 PROCEDURE — 99254 IP/OBS CNSLTJ NEW/EST MOD 60: CPT | Performed by: PSYCHIATRY & NEUROLOGY

## 2020-07-05 RX ADMIN — FERROUS SULFATE TAB 325 MG (65 MG ELEMENTAL FE) 325 MG: 325 (65 FE) TAB at 17:26

## 2020-07-05 RX ADMIN — LORAZEPAM 2 MG: 2 INJECTION INTRAMUSCULAR; INTRAVENOUS at 03:03

## 2020-07-05 RX ADMIN — CLONIDINE HYDROCHLORIDE 0.1 MG: 0.1 TABLET ORAL at 06:35

## 2020-07-05 RX ADMIN — ARFORMOTEROL TARTRATE 15 MCG: 15 SOLUTION RESPIRATORY (INHALATION) at 19:09

## 2020-07-05 RX ADMIN — PANTOPRAZOLE SODIUM 40 MG: 40 TABLET, DELAYED RELEASE ORAL at 06:35

## 2020-07-05 RX ADMIN — CLONIDINE HYDROCHLORIDE 0.1 MG: 0.1 TABLET ORAL at 21:03

## 2020-07-05 RX ADMIN — SODIUM CHLORIDE, PRESERVATIVE FREE 10 ML: 5 INJECTION INTRAVENOUS at 21:03

## 2020-07-05 RX ADMIN — FERROUS SULFATE TAB 325 MG (65 MG ELEMENTAL FE) 325 MG: 325 (65 FE) TAB at 10:26

## 2020-07-05 RX ADMIN — ARFORMOTEROL TARTRATE 15 MCG: 15 SOLUTION RESPIRATORY (INHALATION) at 07:30

## 2020-07-05 RX ADMIN — SODIUM CHLORIDE, PRESERVATIVE FREE 10 ML: 5 INJECTION INTRAVENOUS at 10:27

## 2020-07-05 RX ADMIN — SODIUM CHLORIDE, PRESERVATIVE FREE 10 ML: 5 INJECTION INTRAVENOUS at 00:18

## 2020-07-05 RX ADMIN — ENOXAPARIN SODIUM 40 MG: 40 INJECTION SUBCUTANEOUS at 06:35

## 2020-07-05 RX ADMIN — CLONIDINE HYDROCHLORIDE 0.1 MG: 0.1 TABLET ORAL at 17:26

## 2020-07-05 RX ADMIN — CHLORDIAZEPOXIDE HYDROCHLORIDE 5 MG: 5 CAPSULE ORAL at 06:35

## 2020-07-05 RX ADMIN — LISINOPRIL 10 MG: 10 TABLET ORAL at 10:27

## 2020-07-05 NOTE — PLAN OF CARE
Problem: Patient Care Overview  Goal: Plan of Care Review  Outcome: Ongoing (interventions implemented as appropriate)  Flowsheets (Taken 7/5/2020 4068)  Outcome Summary: Pt fell this am after using urinal in bathroom. Pt wobbled backward and fell despite staff presence, gate belt and walker. Neuro consulted. To see outpt. Continue PT to help with gait retraining for alcoholic cerebellar ataxia. Pt participated in bath and brushed his teeth. Sat up in chair for much of shift. BSC following fall.  DC to skilled rehab when able  to precert tomorrow.

## 2020-07-05 NOTE — SIGNIFICANT NOTE
At approx 21:40 pt sustained witnessed fall. Bed alarm went off, NA responded immediately and witnessed pt stand up from bed, stumble briefly and sit down on floor. Pt denies striking his head and NA did not witness any impact of this kind. Pt has no apparent injuries and denies pain or discomfort.    Prior to this, pt had repeatedly attempted to get up unassisted, setting off bed alarm approx a dozen times since start of shift at 19:00. RN and NA had explained to pt why he was not permitted to get up unassisted, and pt had verbalized understanding (including expressing his own concerns about his poor balance and unsteady ambulation).     Attending MD,  and pt's wife have all been notified. Additional chair alarm strip placed under pt. Will continue to monitor closely.

## 2020-07-05 NOTE — PROGRESS NOTES
Aneudy Caraballo MD                          [phone]      Patient ID:    Name:  Albert Tellez    MRN:  [id number]    1955   65 y.o.  male            Patient Care Team:  Debra Narayan APRN as PCP - General (Nurse Practitioner)    CC/ Reason for visit: Acute alcohol withdrawal syndrome, alcoholic hepatitis, weak    Subjective: Pt seen and examined this AM. No acute overnight events noted. Doing better.  Still confused and having a tough time finishing a sentence.  Working with physical therapy but noted to be significantly weak    ROS: Unable to obtain due to confusion    Objective     Vital Signs past 24hrs    BP range: BP: (125-161)/(77-98) 125/78  Pulse range: Heart Rate:  [60-84] 79  Resp rate range: Resp:  [16-20] 20  Temp range: Temp (24hrs), Av.2 °F (36.8 °C), Min:97.8 °F (36.6 °C), Max:98.6 °F (37 °C)      Ventilator/Non-Invasive Ventilation Settings (From admission, onward)    None          Device (Oxygen Therapy): room air       81.1 kg (178 lb 14.4 oz); Body mass index is 28.01 kg/m².      Intake/Output Summary (Last 24 hours) at 2020 1515  Last data filed at 2020 1404  Gross per 24 hour   Intake 360 ml   Output --   Net 360 ml       PHYSICAL EXAM   Constitutional: Middle aged pt in bed, No acute respiratory distress, no accessory muscle use  Head: - NCAT  Eyes: No pallor.  Anicteric sclerae, EOMI.  ENMT:  Mallampati 4, no oral thrush. Dry MM.   NECK: Trachea midline, No thyromegaly, no palpable cervical lymphadenopathy  Heart: RRR, no murmur. No pedal edema   Lungs: SATHYA +, No wheezes/ crackles heard    Abdomen: Soft. No tenderness, guarding or rigidity. No palpable masses  Extremities: Extremities warm and well perfused. No cyanosis/ clubbing  Neuro: Conscious, confused and severe hardness of hearing, no gross focal neuro deficits  Psych: Mood and affect -sleepy and confused    Scheduled meds:      arformoterol 15 mcg Nebulization BID - RT    cloNIDine 0.1 mg Oral Q8H   enoxaparin 40 mg Subcutaneous Q24H   ferrous sulfate 325 mg Oral BID With Meals   lisinopril 10 mg Oral Q24H   pantoprazole 40 mg Oral Q AM   sodium chloride 10 mL Intravenous Q12H       IV meds:                           Data Review:      Results from last 7 days   Lab Units 07/05/20  0454 07/04/20  0344 07/03/20  1634 07/03/20  0325 07/02/20  0314 07/01/20  0439   SODIUM mmol/L 140 135*  --  137 137 129*   POTASSIUM mmol/L 3.4* 3.8 3.7 3.3* 3.5 3.5   CHLORIDE mmol/L 106 104  --  101 103 97*   CO2 mmol/L 23.1 22.5  --  23.2 23.5 20.0*   BUN mg/dL 5* 6*  --  8 6* 6*   CREATININE mg/dL 0.60* 0.67*  --  0.67* 0.56* 0.61*   CALCIUM mg/dL 8.9 9.2  --  8.7 8.6 8.3*   GLUCOSE mg/dL 95 96  --  85 109* 64*   WBC 10*3/mm3  --   --   --  4.09 5.53 5.51   HEMOGLOBIN g/dL  --   --   --  13.5 13.6 13.0   PLATELETS 10*3/mm3  --   --   --  205 150 132*       Lab Results   Component Value Date    CALCIUM 8.9 07/05/2020    PHOS 3.4 07/05/2020                    Results Review:    I have reviewed the relevant laboratory results and independently reviewed the chest imaging from this hospitalization including the available echocardiogram reports personally and summarized it if/ when appropriate below    Assessment    Acute metabolic encephalopathy  Acute alcohol withdrawal syndrome  Alcohol dependence/abuse  Acute alcoholic hepatitis  Fall x2  Acute hyponatremia  Acute hypokalemia  Anemia  Acute bronchitis  Former smoker  Hypertension    PLAN:  Patient making slow progress with regards to his alcohol withdrawal.  Will continue taper medications and continue with CIWA protocol.  Unfortunately had falls x2 with no obvious body or head injury. Fall precautions.  Physical therapy evaluation   Speech therapy recommendations.  Blood pressure well controlled with current medication.    Patient will likely need rehab for physical therapy and eventual admission to inpatient alcohol rehab program.    Discussed  extensively the patient's wife was very upset that he is not making progress and is having falls and feels that it is because of his dementia.  I have explained to her that given the recent illness and his recovery we will not be able to make a determination about long-term prognosis or dementia diagnosis at this point but he will need to be evaluated as an outpatient further.  He will also need physical therapy and rehab for him to be independent.  She states that she lives with him and works in the morning and is unable to take care of him and I have told her that it is likely not safe for her to take him home but she does not seem to be interested in sending him to subacute rehab.  She was adamant that he gets a neurology evaluation.  I have discussed with Dr. Villanueva and he will see the patient today.    > 30-minute spent in the care of the patient with more than half the time involved in coordinating and counseling.    I have discussed my findings and recommendations with patient.     Aneudy Caraballo MD  7/5/2020

## 2020-07-05 NOTE — CONSULTS
Neurology Consult Note    Consult Date: 7/5/2020    Referring MD: Dr. Caraballo    Reason for Consult I have been asked to see the patient in neurological consultation to render advice and opinion regarding dementia, ataxia    Albert Tellez is a 65 y.o. male with past medical history of chronic hearing loss, melanoma, spinal stenosis, chronic alcohol dependence for over 40 years.  I was consulted at the request of his spouse due to a concern for progressive memory impairment.  She reports that she has noticed difficulty with short-term memory and executive function starting about 2 years ago.  She feels that this has progressively worsened over that time to the point that the patient has difficulty functioning independently at home.  He is able to make himself small meals and dress himself but has difficulty letting the dogs out or making grocery lists or other household tasks.  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  No difficulties bathing or dressing.    He was admitted about 10 days ago for alcohol withdrawal.  He has had some persistent lethargy and therefore benzodiazepines are being weaned.  He has been very ataxic since recovering from his withdrawal phase and has fallen multiple times in the hospital.  He has chronic left hand weakness from a penetrating injury to the left forearm.    Past Medical/Surgical Hx:  Past Medical History:   Diagnosis Date   • Hearing loss     JEREMI HEARING AIDS   • History of melanoma     REMOVED FROM RIGHT TEMPLE   • Hypertension     IN THE PAST. RESOLVED. NO MEDS CURRENTLY   • Rosacea    • Skin cancer     MELANOMA REMOVED FROM RIGHT TEMPLE   • Spinal stenosis, lumbar     L3-4, L4-5     Past Surgical History:   Procedure Laterality Date   • ARM LACERATION REPAIR Left    • LUMBAR LAMINECTOMY DISCECTOMY DECOMPRESSION N/A 12/27/2018    Procedure:  L3-4,4-5 laminectomy;  Surgeon: Shane Ridley MD;  Location: [facility] OR;  Service: Orthopedic Spine   • TONSILLECTOMY     • TOTAL HIP ARTHROPLASTY Left 2012   • WISDOM TOOTH EXTRACTION         Medications On Admission  Medications Prior to Admission   Medication Sig Dispense Refill Last Dose   • cyclobenzaprine (FLEXERIL) 10 MG tablet Take 1 tablet by mouth At Night As Needed for Muscle Spasms. 30 tablet 0 Taking   • doxycycline (VIBRAMYCIN) 100 MG capsule Take 100 mg by mouth Daily. AS NEEDED FOR OUTBREAK OF ROSACA   Taking   • MethylPREDNISolone (MEDROL, RONNY,) 4 MG tablet Use as directed by package instructions 21 tablet 0    • oxyCODONE-acetaminophen (PERCOCET) 7.5-325 MG per tablet Take 1-2 by mouth every 4-6 hours when necessary pain 45 tablet 0 Taking       Allergies:  No Known Allergies    Social Hx:  Social History     Socioeconomic History   • Marital status:      Spouse name: Not on file   • Number of children: Not on file   • Years of education: Not on file   • Highest education level: Not on file   Tobacco Use   • Smoking status: Former Smoker     Last attempt to quit: 2000     Years since quittin.5   • Smokeless tobacco: Never Used   Substance and Sexual Activity   • Alcohol use: No     Frequency: Never   • Drug use: No   • Sexual activity: Defer       Family Hx:  Family History   Problem Relation Age of Onset   • Hypertension Other    • Heart disease Father    • Melanoma Father    • Malig Hyperthermia Neg Hx    Alzheimer's disease, mother    Review of systems  Constitutional: [No fevers, chills]  Eye: [No recent visual problems, eye discharge]  HEENT: + Hearing loss, no vertigo  Respiratory: [No shortness of breath, cough]  Cardiovascular: [No Chest pain, palpitations]  Neurologic: [No weakness, numbness]  Psychiatric: [No anxiety, + depression]    All other systems reviewed and are negative  Exam    /78 (BP Location: Left arm, Patient Position: Lying)   Pulse 79    "Temp 98.6 °F (37 °C) (Oral)   Resp 20   Ht 170.2 cm (67.01\")   Wt 81.1 kg (178 lb 14.4 oz)   SpO2 96%   BMI 28.01 kg/m²   gen: NAD, vitals reviewed  Eyes: fundus sharp with no papilledema or retinal hemorrhages  HEENT: no nuchal rigidity  CVS: RRR, S1, S2  MS: oriented x2, remote memory intact, recent memory impaired, impaired attention/concentration, names 7 words beginning with the letter F and 60 seconds, normal clock draw, language intact, no neglect, normal fund of knowledge  CN: visual acuity grossly normal, visual fields full, PERRL, EOMI, facial sensation equal, no facial droop, hearing symmetric, palate elevates symmetrically, shoulder shrug equal, tongue midline  Motor: 5/5 throughout upper and lower extremities, normal tone  Sensation: intact to vibration and temperature throughout  Reflexes: 2+ throughout upper and lower extremities, downgoing plantars  Coordination: no dysmetria with finger to nose bilaterally  Gait: Wide-based ataxic gait, unsteady station with positive Romberg    DATA:    Lab Results   Component Value Date    GLUCOSE 95 07/05/2020    CALCIUM 8.9 07/05/2020     07/05/2020    K 3.4 (L) 07/05/2020    CO2 23.1 07/05/2020     07/05/2020    BUN 5 (L) 07/05/2020    CREATININE 0.60 (L) 07/05/2020    EGFRIFAFRI 138 06/27/2020    EGFRIFNONA 136 07/05/2020    BCR 8.3 07/05/2020    ANIONGAP 10.9 07/05/2020     Lab Results   Component Value Date    WBC 4.09 07/03/2020    HGB 13.5 07/03/2020    HCT 37.5 07/03/2020    MCV 98.2 (H) 07/03/2020     07/03/2020     No results found for: LDL  No results found for: HGBA1C  No results found for: INR, PROTIME    Lab review: CBC, BMP unremarkable    Imaging review: I personally reviewed his CT head performed this admission which shows moderate generalized atrophy more than expected for age with very prominent cerebellar vermal atrophy.  Radiology report reviewed    Diagnoses:  Alcohol dependence in withdrawal  Alcoholic " dementia  Cerebellar ataxia    Comment: Patient is recovering from alcohol withdrawal and certainly not at his best cognitively however based on the history, exam and neuroimaging he is very likely developing alcoholic dementia.  I counseled him strongly to quit alcohol to delay the progression of this.  I think with some rehab for gait retraining he will likely ultimately return home and be fairly independent but I counseled the patient and his spouse that I would expect his cognition to slowly worsen over time going forward.    PLAN:  Agree with PT for gait retraining for alcoholic cerebellar ataxia  Alcohol cessation counseling  6 month follow up for dementia in neurology clinic, will arrange    Recommendations discussed with Dr. Caraballo. Will see prn.

## 2020-07-05 NOTE — PLAN OF CARE
"-Pt restless and anxious most of night, CIWA 8-10; 8mg Ativan given in total   -Pt slept for a stretch of 3-4hrs - otherwise repeatedly attempting to get out of bed  -Oriented anywhere from x2 to x4 at various times, content of speech intermittently illogical, occasionally suspicious (asking RN, \"what time is it, really?\" and questioning why he is 'really' being given specific med (Lovenox)  -Possible d/c back to [facility] re[facility] today  "

## 2020-07-06 LAB
ALBUMIN SERPL-MCNC: 3.5 G/DL (ref 3.5–5.2)
ANION GAP SERPL CALCULATED.3IONS-SCNC: 11.4 MMOL/L (ref 5–15)
BUN SERPL-MCNC: 5 MG/DL (ref 8–23)
BUN/CREAT SERPL: 6.8 (ref 7–25)
CALCIUM SPEC-SCNC: 9 MG/DL (ref 8.6–10.5)
CHLORIDE SERPL-SCNC: 102 MMOL/L (ref 98–107)
CO2 SERPL-SCNC: 24.6 MMOL/L (ref 22–29)
CREAT SERPL-MCNC: 0.74 MG/DL (ref 0.76–1.27)
GFR SERPL CREATININE-BSD FRML MDRD: 106 ML/MIN/1.73
GLUCOSE SERPL-MCNC: 94 MG/DL (ref 65–99)
PHOSPHATE SERPL-MCNC: 4 MG/DL (ref 2.5–4.5)
POTASSIUM SERPL-SCNC: 3.6 MMOL/L (ref 3.5–5.2)
SODIUM SERPL-SCNC: 138 MMOL/L (ref 136–145)

## 2020-07-06 PROCEDURE — 94799 UNLISTED PULMONARY SVC/PX: CPT

## 2020-07-06 PROCEDURE — 93005 ELECTROCARDIOGRAM TRACING: CPT | Performed by: INTERNAL MEDICINE

## 2020-07-06 PROCEDURE — 93010 ELECTROCARDIOGRAM REPORT: CPT | Performed by: INTERNAL MEDICINE

## 2020-07-06 PROCEDURE — 25010000002 ENOXAPARIN PER 10 MG: Performed by: INTERNAL MEDICINE

## 2020-07-06 PROCEDURE — 80069 RENAL FUNCTION PANEL: CPT | Performed by: INTERNAL MEDICINE

## 2020-07-06 PROCEDURE — 97116 GAIT TRAINING THERAPY: CPT

## 2020-07-06 RX ORDER — CHOLECALCIFEROL (VITAMIN D3) 125 MCG
5 CAPSULE ORAL NIGHTLY PRN
Status: DISCONTINUED | OUTPATIENT
Start: 2020-07-06 | End: 2020-07-07 | Stop reason: HOSPADM

## 2020-07-06 RX ADMIN — FERROUS SULFATE TAB 325 MG (65 MG ELEMENTAL FE) 325 MG: 325 (65 FE) TAB at 08:10

## 2020-07-06 RX ADMIN — SODIUM CHLORIDE, PRESERVATIVE FREE 10 ML: 5 INJECTION INTRAVENOUS at 21:16

## 2020-07-06 RX ADMIN — FERROUS SULFATE TAB 325 MG (65 MG ELEMENTAL FE) 325 MG: 325 (65 FE) TAB at 17:34

## 2020-07-06 RX ADMIN — LORAZEPAM 1 MG: 1 TABLET ORAL at 21:16

## 2020-07-06 RX ADMIN — CLONIDINE HYDROCHLORIDE 0.1 MG: 0.1 TABLET ORAL at 21:16

## 2020-07-06 RX ADMIN — PANTOPRAZOLE SODIUM 40 MG: 40 TABLET, DELAYED RELEASE ORAL at 06:49

## 2020-07-06 RX ADMIN — ARFORMOTEROL TARTRATE 15 MCG: 15 SOLUTION RESPIRATORY (INHALATION) at 07:19

## 2020-07-06 RX ADMIN — ENOXAPARIN SODIUM 40 MG: 40 INJECTION SUBCUTANEOUS at 06:49

## 2020-07-06 RX ADMIN — SODIUM CHLORIDE, PRESERVATIVE FREE 10 ML: 5 INJECTION INTRAVENOUS at 08:17

## 2020-07-06 RX ADMIN — CLONIDINE HYDROCHLORIDE 0.1 MG: 0.1 TABLET ORAL at 06:50

## 2020-07-06 RX ADMIN — ARFORMOTEROL TARTRATE 15 MCG: 15 SOLUTION RESPIRATORY (INHALATION) at 20:19

## 2020-07-06 RX ADMIN — LISINOPRIL 10 MG: 10 TABLET ORAL at 08:10

## 2020-07-06 NOTE — PROGRESS NOTES
Clinical Pharmacy Services: Medication History    Albert Tellez is a 65 y.o. male presenting to [facility] for DTs (delirium tremens) (CMS/[facility]) [F10.231]    He  has a past medical history of Hearing loss, History of melanoma, Hypertension, Rosacea, Skin cancer, and Spinal stenosis, lumbar.    Allergies as of 06/27/2020   • (No Known Allergies)       Medication information was obtained from: patient and pharmacy  Pharmacy and Phone Number: [facility] pharmacy [phone]    Prior to Admission Medications     Prescriptions Last Dose Informant Patient Reported? Taking?    doxycycline (VIBRAMYCIN) 100 MG capsule  Self Yes No    Take 100 mg by mouth Daily. AS NEEDED FOR OUTBREAK OF ROSACEA            Medication notes:   Spoke to patient, patient on take doxycycline prn for outbreak of rosacea- not currently taking. Verified with pt pharmacy. No other meds on file or per pt.     This medication list is complete to the best of my knowledge as of 7/6/2020    Please call if questions.    Alcira Goodwin, PharmD, BCPS  7/6/2020 14:02

## 2020-07-06 NOTE — PROGRESS NOTES
Continued Stay Note  [facility]     Patient Name: Albert Tellez  MRN: [id number]  Today's Date: 7/6/2020    Admit Date: 6/27/2020    Discharge Plan     Row Name 07/06/20 1036       Plan    Plan  Pending SNF referrals, will require pre-cert    Plan Comments  CCP met with patient at bedside for follow up. Patient stated due to weakness he anticipates needing SNF at discharge. Patient was agreeable for more referrals to be placed to more facilities. CCP placed more referrals in [facility] to [facility], [facility], [facility], [facility], [facility], [place], [facility], [place], [place], and [facility]. CCP followed up on previous referrals. CCP left voicemail for Lacey[facility], Riri/Dominic [facility], and Beto of [place] and [place]. Radha/Anabel Domínguez stated unable to accept patient at this time. [facility] & [facility] are still evaluating the patient's referral. Roberta/Beto of [facility] requested referral be sent again in [facility] and stated she would call back if able to accept patient. Patient will require pre-cert. CCP will follow for progress with PT and pending SNF referrals. Sudha Rahman CSALCIDES        Discharge Codes    No documentation.             YARITZA Griffith

## 2020-07-06 NOTE — PLAN OF CARE
Problem: Patient Care Overview  Goal: Plan of Care Review  Outcome: Ongoing (interventions implemented as appropriate)  Flowsheets (Taken 7/6/2020 1495)  Progress: improving  Plan of Care Reviewed With: patient  Outcome Summary: Pt CIWA score 0-1. Up with assist x2 and use of walker. BP trending low. Waiting for presert. Possible DC tomorrow.     Problem: Fall Risk (Adult)  Goal: Identify Related Risk Factors and Signs and Symptoms  Outcome: Ongoing (interventions implemented as appropriate)     Problem: Fall Risk (Adult)  Goal: Absence of Fall  Outcome: Ongoing (interventions implemented as appropriate)  Flowsheets (Taken 7/6/2020 1713)  Absence of Fall: making progress toward outcome     Problem: Skin Injury Risk (Adult)  Goal: Identify Related Risk Factors and Signs and Symptoms  Outcome: Ongoing (interventions implemented as appropriate)     Problem: Skin Injury Risk (Adult)  Goal: Skin Health and Integrity  Outcome: Ongoing (interventions implemented as appropriate)  Flowsheets (Taken 7/6/2020 5667)  Skin Health and Integrity: making progress toward outcome     Problem: Restraint, Nonbehavioral (Nonviolent)  Goal: Rationale and Justification  Outcome: Ongoing (interventions implemented as appropriate)     Problem: Restraint, Nonbehavioral (Nonviolent)  Goal: Nonbehavioral (Nonviolent) Restraint: Absence of Injury/Harm  Outcome: Ongoing (interventions implemented as appropriate)     Problem: Restraint, Nonbehavioral (Nonviolent)  Goal: Nonbehavioral (Nonviolent) Restraint: Achievement of Discontinuation Criteria  Outcome: Ongoing (interventions implemented as appropriate)     Problem: Restraint, Nonbehavioral (Nonviolent)  Goal: Nonbehavioral (Nonviolent) Restraint: Preservation of Dignity and Wellbeing  Outcome: Ongoing (interventions implemented as appropriate)     Problem: Patient Care Overview  Goal: Interprofessional Rounds/Family Conf  Outcome: Ongoing (interventions implemented as  appropriate)  Flowsheets (Taken 7/6/2020 4776)  Participants: ; nursing; patient; physician     Problem: Patient Care Overview  Goal: Discharge Needs Assessment  Outcome: Ongoing (interventions implemented as appropriate)     Problem: Patient Care Overview  Goal: Individualization and Mutuality  Outcome: Ongoing (interventions implemented as appropriate)     Problem: Alcohol Withdrawal Acute, Risk/Actual (Adult)  Goal: Signs and Symptoms of Listed Potential Problems Will be Absent, Minimized or Managed (Alcohol Withdrawal Acute, Risk/Actual)  Outcome: Ongoing (interventions implemented as appropriate)     Problem: Nutrition, Imbalanced: Inadequate Oral Intake (Adult)  Goal: Identify Related Risk Factors and Signs and Symptoms  Outcome: Ongoing (interventions implemented as appropriate)     Problem: Nutrition, Imbalanced: Inadequate Oral Intake (Adult)  Goal: Improved Oral Intake  Outcome: Ongoing (interventions implemented as appropriate)  Flowsheets (Taken 7/6/2020 4542)  Improved Oral Intake: making progress toward outcome     Problem: Nutrition, Imbalanced: Inadequate Oral Intake (Adult)  Goal: Prevent Further Weight Loss  Outcome: Ongoing (interventions implemented as appropriate)

## 2020-07-06 NOTE — THERAPY TREATMENT NOTE
Patient Name: Albert Tellez  : 1955    MRN: [id number]                              Today's Date: 2020       Admit Date: 2020    Visit Dx:     ICD-10-CM ICD-9-CM   1. DTs (delirium tremens) (CMS/[facility]) F10.231 291.0   2. Generalized weakness R53.1 780.79     Patient Active Problem List   Diagnosis   • Spinal stenosis of lumbar region with neurogenic claudication   • Lumbar spinal stenosis   • DTs (delirium tremens) (CMS/[facility])     Past Medical History:   Diagnosis Date   • Hearing loss     JEREMI HEARING AIDS   • History of melanoma     REMOVED FROM RIGHT TEMPLE   • Hypertension     IN THE PAST. RESOLVED. NO MEDS CURRENTLY   • Rosacea    • Skin cancer     MELANOMA REMOVED FROM RIGHT TEMPLE   • Spinal stenosis, lumbar     L3-4, L4-5     Past Surgical History:   Procedure Laterality Date   • ARM LACERATION REPAIR Left    • LUMBAR LAMINECTOMY DISCECTOMY DECOMPRESSION N/A 2018    Procedure: L3-4,4-5 laminectomy;  Surgeon: Shane Ridley MD;  Location: [facility];  Service: Orthopedic Spine   • TONSILLECTOMY     • TOTAL HIP ARTHROPLASTY Left 2012   • WISDOM TOOTH EXTRACTION       General Information     Row Name 20 1247          PT Evaluation Time/Intention    Document Type  therapy note (daily note)  -     Mode of Treatment  individual therapy;physical therapy  -     Row Name 20 1247          General Information    Patient Profile Reviewed?  yes  -     Existing Precautions/Restrictions  fall  -     Row Name 20 1247          Cognitive Assessment/Intervention- PT/OT    Orientation Status (Cognition)  oriented to;person;place  -     Cognitive Assessment/Intervention Comment  Pt less confused today. Still not completely oriented. agreeable and pleasant  -       User Key  (r) = Recorded By, (t) = Taken By, (c) = Cosigned By    Initials Name Provider Type    LC Miriam Molina, PT Physical Therapist        Mobility     Row Name 20 1248          Bed Mobility  Assessment/Treatment    Bed Mobility Assessment/Treatment  supine-sit  -LC     Supine-Sit [place] (Bed Mobility)  verbal cues;nonverbal cues (demo/gesture);contact guard  -LC     Comment (Bed Mobility)  HHA to get EOB  -LC     Row Name 07/06/20 1248          Transfer Assessment/Treatment    Comment (Transfers)  cueing for hand placement and to hold RW steady as pt stood  -LC     Row Name 07/06/20 1248          Sit-Stand Transfer    Sit-Stand [place] (Transfers)  verbal cues;nonverbal cues (demo/gesture);contact guard  -LC     Assistive Device (Sit-Stand Transfers)  walker, front-wheeled  -LC     Row Name 07/06/20 1248          Gait/Stairs Assessment/Training    Gait/Stairs Assessment/Training  gait/ambulation independence;gait/ambulation assistive device  -     [place] Level (Gait)  minimum assist (75% patient effort)  -     Assistive Device (Gait)  walker, front-wheeled  -LC     Distance in Feet (Gait)  90'  -     Pattern (Gait)  step-through  -     Deviations/Abnormal Patterns (Gait)  antalgic;ataxic;gait speed decreased;stride length decreased  -     Bilateral Gait Deviations  forward flexed posture;weight shift ability decreased;heel strike decreased  -     Comment (Gait/Stairs)  Pt ambulated into [facility]per with RW and decided he didn't need it so he pushed it to the side. Pt very unsteady so given 1 HHA. Pt's unsteadiness worsened and he began using the wall. Pt encouraged to use the walker to get back in the room. Pt's pant fell off while walking and pt did not seem aware.  -       User Key  (r) = Recorded By, (t) = Taken By, (c) = Cosigned By    Initials Name Provider Type     Miriam Molina PT Physical Therapist        Obj/Interventions     Row Name 07/06/20 1251          Static Sitting Balance    Level of [place] (Unsupported Sitting, Static Balance)  independent  -     Sitting Position (Unsupported Sitting, Static Balance)  sitting on edge of bed;sitting in chair  -     Row  Name 07/06/20 1251          Dynamic Sitting Balance    Level of Summit, Reaches Outside Midline (Sitting, Dynamic Balance)  supervision  -     Sitting Position, Reaches Outside Midline (Sitting, Dynamic Balance)  sitting on edge of bed;sitting in chair  -     Row Name 07/06/20 1251          Static Standing Balance    Level of Summit (Supported Standing, Static Balance)  minimal assist, 75% patient effort  -     Assistive Device Utilized (Supported Standing, Static Balance)  walker, rolling  -       User Key  (r) = Recorded By, (t) = Taken By, (c) = Cosigned By    Initials Name Provider Type    Miriam Iraheta, PT Physical Therapist        Goals/Plan    No documentation.       Clinical Impression     Row Name 07/06/20 1252          Plan of Care Review    Plan of Care Reviewed With  patient  -     Progress  improving  -     Outcome Summary  Pt demonstrated improved functional mobility, ambulating 90' with RW and min A. Pt attempted to walk without RW but is too unsteady with HHA alone. Pt encouraged to use the walker. He will cont to benefit from PT.  -     Row Name 07/06/20 1252          Physical Therapy Clinical Impression    Criteria for Skilled Interventions Met (PT Clinical Impression)  yes;treatment indicated  -     Rehab Potential (PT Clinical Summary)  fair, will monitor progress closely  -     Row Name 07/06/20 1252          Positioning and Restraints    Pre-Treatment Position  in bed  -     Post Treatment Position  chair  -     In Chair  reclined;call light within reach;encouraged to call for assist;exit alarm on  -       User Key  (r) = Recorded By, (t) = Taken By, (c) = Cosigned By    Initials Name Provider Type    Miriam Iraheta, PT Physical Therapist        Outcome Measures     Row Name 07/06/20 1254          How much help from another person do you currently need...    Turning from your back to your side while in flat bed without using bedrails?  3  -     Moving from  lying on back to sitting on the side of a flat bed without bedrails?  3  -LC     Moving to and from a bed to a chair (including a wheelchair)?  3  -LC     Standing up from a chair using your arms (e.g., wheelchair, bedside chair)?  3  -LC     Climbing 3-5 steps with a railing?  2  -LC     To walk in hospital room?  3  -LC     AM-PAC 6 Clicks Score (PT)  17  -LC       User Key  (r) = Recorded By, (t) = Taken By, (c) = Cosigned By    Initials Name Provider Type    Miriam Iraheat, RONNIE Physical Therapist        Physical Therapy Education                 Title: PT OT SLP Therapies (In Progress)     Topic: Physical Therapy (Done)     Point: Mobility training (Done)     Description:   Instruct learner(s) on safety and technique for assisting patient out of bed, chair or wheelchair.  Instruct in the proper use of assistive devices, such as walker, crutches, cane or brace.              Patient Friendly Description:   It's important to get you on your feet again, but we need to do so in a way that is safe for you. Falling has serious consequences, and your personal safety is the most important thing of all.        When it's time to get out of bed, one of us or a family member will sit next to you on the bed to give you support.     If your doctor or nurse tells you to use a walker, crutches, a cane, or a brace, be sure you use it every time you get out of bed, even if you think you don't need it.    Learning Progress Summary           Patient Acceptance, E,TB, VU,NR by KING at 7/6/2020 1255    Acceptance, E,D, NR by RS at 7/4/2020 1123    Acceptance, E,D, NR,DU by AP at 7/3/2020 1251                   Point: Home exercise program (Done)     Description:   Instruct learner(s) on appropriate technique for monitoring, assisting and/or progressing patient with therapeutic exercises and activities.              Learning Progress Summary           Patient Acceptance, E,TB, VU,NR by KING at 7/6/2020 1255    Acceptance, E,D, NR by RS at  7/4/2020 1123                   Point: Body mechanics (Done)     Description:   Instruct learner(s) on proper positioning and spine alignment for patient and/or caregiver during mobility tasks and/or exercises.              Learning Progress Summary           Patient Acceptance, E,TB, VU,NR by  at 7/6/2020 1255    Nonacceptance, E,TB, NR,NL by TS at 7/5/2020 2348    Comment:  Pt stares & does not state whether they understanding teaching. However, is able to answer oreintation questions.    Acceptance, E,D, NR by RS at 7/4/2020 1123    Acceptance, E,D, NR,DU by AP at 7/3/2020 1251                   Point: Precautions (Done)     Description:   Instruct learner(s) on prescribed precautions during mobility and gait tasks              Learning Progress Summary           Patient Acceptance, E,TB, VU,NR by  at 7/6/2020 1255    Nonacceptance, E,TB, NR,NL by TS at 7/5/2020 2348    Comment:  Pt stares & does not state whether they understanding teaching. However, is able to answer oreintation questions.    Acceptance, E,D, NR by  at 7/4/2020 1123    Acceptance, E,D, NR,DU by  at 7/3/2020 1251                               User Key     Initials Effective Dates Name Provider Type Discipline    RS 04/03/19 -  Keiry Lowe, PT Physical Therapist PT    TS 07/17/19 -  Mónica He, RN Registered Nurse Nurse     08/23/19 -  Miriam Molina, PT Physical Therapist PT    AP 06/23/20 -  Dalton Ward, RONNIE Student PT Student PT              PT Recommendation and Plan     Outcome Summary/Treatment Plan (PT)  Anticipated Discharge Disposition (PT): anticipate therapy at next level of care  Plan of Care Reviewed With: patient  Progress: improving  Outcome Summary: Pt demonstrated improved functional mobility, ambulating 90' with RW and min A. Pt attempted to walk without RW but is too unsteady with HHA alone. Pt encouraged to use the walker. He will cont to benefit from PT.     Time Calculation:   PT Charges     Row Name  07/06/20 1256             Time Calculation    Start Time  1149  -      Stop Time  1201  -      Time Calculation (min)  12 min  -      PT Received On  07/06/20  -      PT - Next Appointment  07/07/20  -        User Key  (r) = Recorded By, (t) = Taken By, (c) = Cosigned By    Initials Name Provider Type    Miriam Iraheta, PT Physical Therapist        Therapy Charges for Today     Code Description Service Date Service Provider Modifiers Qty    [id number] HC GAIT TRAINING EA 15 MIN 7/6/2020 Miriam Molina, PT GP 1          PT G-Codes  Outcome Measure Options: AM-PAC 6 Clicks Basic Mobility (PT)  AM-PAC 6 Clicks Score (PT): 17    Miriam Molina PT  7/6/2020

## 2020-07-06 NOTE — DISCHARGE PLACEMENT REQUEST
"Albert Tellez (65 y.o. Male)     Date of Birth Social Security Number Address Home Phone MRN    1955  [address] [id number] [phone] [id number]    Latter day Marital Status          Patient Refused        Admission Date Admission Type Admitting Provider Attending Provider Department, Room/Bed    6/27/20 Emergency Indio Chin MD Kellie, Brandon John, MD [address], [address]/1    Discharge Date Discharge Disposition Discharge Destination                       Attending Provider:  Blayne Chin MD    Allergies:  No Known Allergies    Isolation:  None   Infection:  None   Code Status:  No CPR    Ht:  170.2 cm (67.01\")   Wt:  83.6 kg (184 lb 4.9 oz)    Admission Cmt:  None   Principal Problem:  None                Active Insurance as of 6/27/2020     Primary Coverage     Payor Plan Insurance Group Employer/Plan Group    ANTHEM BLUE CROSS ANTHEM BLUE CROSS BLUE SHIELD PPO [id number]     Payor Plan Address Payor Plan Phone Number Payor Plan Fax Number Effective Dates    [address] [phone]  7/1/2017 - None Entered    [facility] [id number]       Subscriber Name Subscriber Birth Date Member ID       DAYNA TELLEZ 2/8/1958 [id number]                 Emergency Contacts      (Rel.) Home Phone Work Phone Mobile Phone    Dayna Tellez (Spouse) [phone] -- --              "

## 2020-07-06 NOTE — PLAN OF CARE
Problem: Patient Care Overview  Goal: Plan of Care Review  Outcome: Ongoing (interventions implemented as appropriate)  Flowsheets (Taken 7/6/2020 1564)  Progress: improving  Plan of Care Reviewed With: patient  Outcome Summary: Pt demonstrated improved functional mobility, ambulating 90' with RW and min A. Pt attempted to walk without RW but is too unsteady with HHA alone. Pt encouraged to use the walker. He will cont to benefit from PT.    Patient was wearing a face mask during this therapy encounter. Therapist used appropriate personal protective equipment including mask and gloves.  Mask used was standard procedure mask. Appropriate PPE was worn during the entire therapy session. Hand hygiene was completed before and after therapy session. Patient is not in enhanced droplet precautions.

## 2020-07-06 NOTE — PROGRESS NOTES
"  Daily Progress Note.   [address]  7/6/2020    Patient:  Name:  Albert Tellez  MRN:  [id number]  1955  65 y.o.  male         CC/ Reason for visit: Acute alcohol withdrawal syndrome, alcoholic hepatitis, weak       Interval History:  Pt seen examined, resting in bed.  Speech clear.  Patient states legs feels strong but balance and coordination seem more the issue.  Denies vertigo however.  ROS: No fever, no diarrhea, no chest pain  PMFSSH: no change    Physical Exam:  /90 (BP Location: Right arm, Patient Position: Lying)   Pulse 71   Temp 98.2 °F (36.8 °C) (Oral)   Resp 18   Ht 170.2 cm (67.01\")   Wt 83.6 kg (184 lb 4.9 oz)   SpO2 92%   BMI 28.86 kg/m²   Body mass index is 28.86 kg/m².    Intake/Output Summary (Last 24 hours) at 7/6/2020 1208  Last data filed at 7/6/2020 0819  Gross per 24 hour   Intake 478 ml   Output 700 ml   Net -222 ml     General appearance: non toxic, conversant   Eyes: anicteric sclerae, moist conjunctivae; no lid-lag; PERRLA  HENT: Atraumatic; oropharynx clear with moist mucous membranes and no mucosal ulcerations; normal hard and soft palate  Neck: Trachea midline; FROM, supple, no thyromegaly or lymphadenopathy  Lungs: CTA, with normal respiratory effort and no intercostal retractions  CV: RRR, no MRGs   Abdomen: Soft, non-tender; no masses or HSM  Extremities: No peripheral edema or extremity lymphadenopathy  Skin: Normal temperature, turgor and texture; no rash, ulcers or subcutaneous nodules  Psych: Appropriate affect, alert and oriented to person, place and time    Data Review:  Notable Labs:  Results from last 7 days   Lab Units 07/03/20  0325 07/02/20  0314 07/01/20  0439 06/30/20  0546   WBC 10*3/mm3 4.09 5.53 5.51 6.12   HEMOGLOBIN g/dL 13.5 13.6 13.0 14.1   PLATELETS 10*3/mm3 205 150 132* 127*     Results from last 7 days   Lab Units 07/06/20  0434 07/05/20  0454 07/04/20  0344 07/03/20  1634 07/03/20  0325 07/02/20  0314 07/01/20  0439 " 06/30/20  0546   SODIUM mmol/L 138 140 135*  --  137 137 129* 135*   POTASSIUM mmol/L 3.6 3.4* 3.8 3.7 3.3* 3.5 3.5 4.0   CHLORIDE mmol/L 102 106 104  --  101 103 97* 99   CO2 mmol/L 24.6 23.1 22.5  --  23.2 23.5 20.0* 18.3*   BUN mg/dL 5* 5* 6*  --  8 6* 6* 6*   CREATININE mg/dL 0.74* 0.60* 0.67*  --  0.67* 0.56* 0.61* 0.73*   GLUCOSE mg/dL 94 95 96  --  85 109* 64* 65   CALCIUM mg/dL 9.0 8.9 9.2  --  8.7 8.6 8.3* 8.7   PHOSPHORUS mg/dL 4.0 3.4 2.9  --  3.2 3.2 2.5 2.5   Estimated Creatinine Clearance: 104.3 mL/min (A) (by C-G formula based on SCr of 0.74 mg/dL (L)).    Results from last 7 days   Lab Units 07/03/20  0325 07/02/20  0314 07/01/20  0439   PLATELETS 10*3/mm3 205 150 132*             Imaging:  Reviewed chest images personally from past 3 days    Scheduled meds:      arformoterol 15 mcg Nebulization BID - RT   cloNIDine 0.1 mg Oral Q8H   enoxaparin 40 mg Subcutaneous Q24H   ferrous sulfate 325 mg Oral BID With Meals   lisinopril 10 mg Oral Q24H   pantoprazole 40 mg Oral Q AM   sodium chloride 10 mL Intravenous Q12H       ASSESSMENT  /  PLAN:  Acute metabolic encephalopathy  Acute alcohol withdrawal syndrome  Alcohol dependence/abuse  Acute alcoholic hepatitis  Fall x2  Acute hyponatremia  Acute hypokalemia  Anemia  Acute bronchitis  Former smoker  Hypertension      Cont clonidine  Cont lisinopril    Neurology consultation reviewed, appreciated.      Cont work with PT  To SNF when precert obtained.  All issues new to me today.  Prior hospital course, labs and imaging reviewed.          Blayne Chin MD  [facility]  07/06/20  12:08 PM

## 2020-07-06 NOTE — PROGRESS NOTES
Continued Stay Note  [facility]     Patient Name: Albert Tellez  MRN: [id number]  Today's Date: 7/6/2020    Admit Date: 6/27/2020    Discharge Plan     Row Name 07/06/20 1122       Plan    Plan  Skilled rehab referrals are pending    Plan Comments  Spoke with spouse and she would like a referral for [facility] skilled rehab as well since it is close to there home.  Referral pending.....................Chelly Sorto RN    Row Name 07/06/20 1036       Plan    Plan  Pending SNF referrals, will require pre-cert    Plan Comments  CCP met with patient at bedside for follow up. Patient stated due to weakness he anticipates needing SNF at discharge. Patient was agreeable for more referrals to be placed to more facilities. CCP placed more referrals in [facility] to [facility], [facility], [facility], [facility], [facility], [place], [facility], [place], [place], and [facility]. CCP followed up on previous referrals. CCP left voicemail for Lacey/Daphne [place], Riri/Dominic Brian, and Beto of [facility]. Radha/Anabel Domínguez stated unable to accept patient at this time. [facility] are still evaluating the patient's referral. Roberta/Beto of [facility] requested referral be sent again in [facility] and stated she would call back if able to accept patient. Patient will require pre-cert. CCP will follow for progress with PT and pending SNF referrals. Sudha Rahman W        Discharge Codes    No documentation.             Chelly Sorto RN

## 2020-07-06 NOTE — PROGRESS NOTES
Continued Stay Note  [facility]     Patient Name: Albert Tellez  MRN: [id number]  Today's Date: 7/6/2020    Admit Date: 6/27/2020    Discharge Plan     Row Name 07/06/20 1502       Plan    Plan  Pending SNF referrals, follow up with [facility] for pre-cert    Plan Comments  CCP noted [facility] had accepted patient in [facility]. CCP called [facility] admissions office to request pre-cert. CCP left voicemail requesting returned phone call to  CCP office number to confirm patient has been accepted and pre-cert has been started. CCP will continue to follow up with referrals for SNF placement. Sudha GUDINO        Discharge Codes    No documentation.             YARITZA Griffith

## 2020-07-06 NOTE — PLAN OF CARE
CIWA 2-4. Pt A & O x4, however, is forgetful. Up with assist x2. BP elevated. Pt has BBB, ordered EKG. Safety maintained, will continue to monitor. Possible D/C today.

## 2020-07-06 NOTE — PROGRESS NOTES
Discharge Planning Assessment  [facility]     Patient Name: Albert Tellez  MRN: [id number]  Today's Date: 7/6/2020    Admit Date: 6/27/2020    Discharge Needs Assessment    No documentation.       Discharge Plan     Row Name 07/06/20 1647       Plan    Plan  [facility] pending [place] pre-cert started today vs [facility]     Plan Comments  Spoke with spouse, Fabienne Tellez [phone].  She accepts the bed at [facility].  Ni with [place] [facility] states she will start the pre-cert.  However the spouse states she prefers that he goes to [facility] for ETOH withdrawl.  She states he needs to be IADLs to return.  She states she plans to stop by today to see how he is doing..........................Chelly Sorto RN    Row Name 07/06/20 1504       Plan    Plan  Pending SNF referrals, follow up with [facility] for pre-cert    Plan Comments  CCP noted [facility] SNF had accepted patient in [facility]. CCP called [facility] admissions office to request pre-cert. CCP left voicemail requesting returned phone call to  CCP office number to confirm patient has been accepted and pre-cert has been started. CCP will continue to follow up with referrals for SNF placement. Sudha Rahman [facility]        Destination      Service Provider Request Status Selected Services Address Phone Number Fax Number    [facility] Accepted N/A [address] [facility] [address] [phone]    [facility] Accepted N/A [address] [facility] [address] [phone]    [facility] Pending - Request Sent N/A [address] [facility] [address] [phone]    [facility] Pending - Request Sent N/A [address] [facility] [address] [phone]    \[facility]"" [facility] Pending - Request Sent N/A [address] [facility] [address] [phone]  [phone]    [facility] Pending - Request Sent N/A [address], [facility] [id number] [phone]    [facility] Pending - Request Sent N/A [address], [facility] [address] [phone]    [facility] Pending - Request Sent N/A [address], [facility] [address] [phone]    [facility] Declined  Current or History of Substance Abuse, Behavior Issues N/A [address], [facility] [address] [phone]    [facility] Declined  out of network w/ this policy N/A [address] , [facility] [id number] [phone]    [facility] Declined  Patient Insurance Not Accepted N/A [address], [facility] [id number] [phone]    [facility] Declined  Insurance Denial N/A [address] [facility] [address] [phone]    [facility] Declined  Insurance Denial N/A [address] [facility] [address] [phone]    [facility] Declined  Unable to Meet Patient Needs N/A [address], [facility] [address] [phone]    [facility]  Patient Insurance Not Accepted N/A [address], [facility] [phone] [phone]    [facility] Declined  Patient Insurance Not Accepted N/A [address] [facility] [address] [phone]    [facility] Declined  No Bed Available N/A [address] [facility] [facility] [id number] [phone]    [facility] Declined  Insurance Denial N/A [address] [facility] [address] [phone]    [facility]  Current or History of Substance Abuse, No Bed Available N/A [address] [facility] [address] [phone]      Durable Medical Equipment      Coordination  has not been started for this encounter.      Dialysis/Infusion      Coordination has not been started for this encounter.      Home Medical Care      Coordination has not been started for this encounter.      Therapy      Coordination has not been started for this encounter.      Community Resources      Coordination has not been started for this encounter.          Demographic Summary    No documentation.       Functional Status    No documentation.       Psychosocial    No documentation.       Abuse/Neglect    No documentation.       Legal    No documentation.       Substance Abuse    No documentation.       Patient Forms    No documentation.           Chelly Sorto RN

## 2020-07-07 VITALS
DIASTOLIC BLOOD PRESSURE: 89 MMHG | HEIGHT: 67 IN | WEIGHT: 180.12 LBS | SYSTOLIC BLOOD PRESSURE: 133 MMHG | BODY MASS INDEX: 28.27 KG/M2 | RESPIRATION RATE: 18 BRPM | OXYGEN SATURATION: 99 % | HEART RATE: 85 BPM | TEMPERATURE: 98.5 F

## 2020-07-07 LAB
ALBUMIN SERPL-MCNC: 3.5 G/DL (ref 3.5–5.2)
ANION GAP SERPL CALCULATED.3IONS-SCNC: 7.4 MMOL/L (ref 5–15)
BUN SERPL-MCNC: 4 MG/DL (ref 8–23)
BUN/CREAT SERPL: 5.5 (ref 7–25)
CALCIUM SPEC-SCNC: 9.3 MG/DL (ref 8.6–10.5)
CHLORIDE SERPL-SCNC: 102 MMOL/L (ref 98–107)
CO2 SERPL-SCNC: 27.6 MMOL/L (ref 22–29)
CREAT SERPL-MCNC: 0.73 MG/DL (ref 0.76–1.27)
GFR SERPL CREATININE-BSD FRML MDRD: 108 ML/MIN/1.73
GLUCOSE SERPL-MCNC: 82 MG/DL (ref 65–99)
PHOSPHATE SERPL-MCNC: 3.6 MG/DL (ref 2.5–4.5)
POTASSIUM SERPL-SCNC: 3.7 MMOL/L (ref 3.5–5.2)
SARS-COV-2 N GENE NPH QL NAA+PROBE: NOT DETECTED
SODIUM SERPL-SCNC: 137 MMOL/L (ref 136–145)

## 2020-07-07 PROCEDURE — 87635 SARS-COV-2 COVID-19 AMP PRB: CPT | Performed by: INTERNAL MEDICINE

## 2020-07-07 PROCEDURE — 94799 UNLISTED PULMONARY SVC/PX: CPT

## 2020-07-07 PROCEDURE — 80069 RENAL FUNCTION PANEL: CPT | Performed by: INTERNAL MEDICINE

## 2020-07-07 PROCEDURE — 25010000002 ENOXAPARIN PER 10 MG: Performed by: INTERNAL MEDICINE

## 2020-07-07 PROCEDURE — 92526 ORAL FUNCTION THERAPY: CPT | Performed by: SPEECH-LANGUAGE PATHOLOGIST

## 2020-07-07 PROCEDURE — C9803 HOPD COVID-19 SPEC COLLECT: HCPCS | Performed by: INTERNAL MEDICINE

## 2020-07-07 RX ORDER — LISINOPRIL 10 MG/1
10 TABLET ORAL
Qty: 30 TABLET | Refills: 0 | Status: SHIPPED | OUTPATIENT
Start: 2020-07-08

## 2020-07-07 RX ORDER — FERROUS SULFATE 325(65) MG
325 TABLET ORAL 2 TIMES DAILY WITH MEALS
Qty: 60 TABLET | Refills: 0 | Status: SHIPPED | OUTPATIENT
Start: 2020-07-07

## 2020-07-07 RX ADMIN — LORAZEPAM 1 MG: 1 TABLET ORAL at 08:29

## 2020-07-07 RX ADMIN — PANTOPRAZOLE SODIUM 40 MG: 40 TABLET, DELAYED RELEASE ORAL at 06:21

## 2020-07-07 RX ADMIN — SODIUM CHLORIDE, PRESERVATIVE FREE 10 ML: 5 INJECTION INTRAVENOUS at 08:23

## 2020-07-07 RX ADMIN — CLONIDINE HYDROCHLORIDE 0.1 MG: 0.1 TABLET ORAL at 08:21

## 2020-07-07 RX ADMIN — CLONIDINE HYDROCHLORIDE 0.1 MG: 0.1 TABLET ORAL at 14:29

## 2020-07-07 RX ADMIN — FERROUS SULFATE TAB 325 MG (65 MG ELEMENTAL FE) 325 MG: 325 (65 FE) TAB at 08:21

## 2020-07-07 RX ADMIN — LISINOPRIL 10 MG: 10 TABLET ORAL at 08:21

## 2020-07-07 RX ADMIN — ARFORMOTEROL TARTRATE 15 MCG: 15 SOLUTION RESPIRATORY (INHALATION) at 06:57

## 2020-07-07 RX ADMIN — ENOXAPARIN SODIUM 40 MG: 40 INJECTION SUBCUTANEOUS at 08:21

## 2020-07-07 NOTE — PLAN OF CARE
Problem: Patient Care Overview  Goal: Plan of Care Review  Flowsheets (Taken 7/7/2020 1604)  Plan of Care Reviewed With: patient  Outcome Summary: Re-Eval:  Pt demonstrated no overt s/s aspiration with thin liquids by cup, soft/hard solids, or mixed consistencies.  Pt with productive cough following thin by straw 2/2 trials.  Pt with independent large bolus size.  REC upgrade diet to regular diet with thin liquids, no straw.  Meds as tolerated.  Upright w/ all po.

## 2020-07-07 NOTE — PROGRESS NOTES
Continued Stay Note  [facility]     Patient Name: Albert Tellez  MRN: [id number]  Today's Date: 7/7/2020    Admit Date: 6/27/2020    Discharge Plan     Row Name 07/07/20 1612       Plan    Final Discharge Disposition Code  01 - home or self-care    Final Note  home with spouse declines home health and skilled rehab, plans to take him to [facility] for inpatient substance abuse in  a few days    Row Name 07/07/20 1555       Plan    Plan  Home     Plan Comments  Spouse is here to transport.  She plans to take him home and then to [facility].  She declines home health to be set up.  She declines walker.  States that the patient has one from a previous hip fracture.  Patient declines needs.  ..............................Chelly Sorto RN    Row Name 07/07/20 1352       Plan    Plan  [facility] has skilled rehab pre-cert vs home     Plan Comments  Incoming call from Fabienne Tellez [phone].  She states she told staff last night that the she does not want to take her spouse to subacute rehab.  She states he walked in the room and brushed his teeth.  She wants to take him to [facility] inpatient ETOH program that he was admitted from.  She was upset that staff did not pass along information from their long discussion last night when she visited.  Explained that dc orders are anticipated and that pre-cert has been obtained for a safe dc plan.  She can take the patient home at DC if she wants.  Covid test for skilled rehab has been sent and is pending.  Spoke with Ni at [place].  She states that the pre-cert will need to be redone if she does not bring him today and that she needs the Covid results prior admission to [facility] subacute.  ................................Chelly Sorto RN        Discharge Codes    No documentation.       Expected Discharge Date and Time     Expected Discharge Date Expected Discharge Time    Jul 7, 2020             Chelly Sorto RN

## 2020-07-07 NOTE — THERAPY RE-EVALUATION
Acute Care - Speech Language Pathology   Swallow Re-Evaluation [facility]     Patient Name: Albert Tellez  : 1955  MRN: [id number]  Today's Date: 2020               Admit Date: 2020    Visit Dx:      ICD-10-CM ICD-9-CM   1. DTs (delirium tremens) (CMS/[facility]) F10.231 291.0   2. Generalized weakness R53.1 780.79     Patient Active Problem List   Diagnosis   • Spinal stenosis of lumbar region with neurogenic claudication   • Lumbar spinal stenosis   • DTs (delirium tremens) (CMS/[facility])       Therapy Treatment  Rehabilitation Treatment Summary     Row Name 20 1300             Treatment Time/Intention    Discipline  speech language pathologist  -SA      Document Type  therapy note (daily note)  -SA      Subjective Information  no complaints  -SA      Mode of Treatment  individual therapy  -SA      Recorded by [SA] Gretchen Villa MS CCC-SLP 20 1502        User Key  (r) = Recorded By, (t) = Taken By, (c) = Cosigned By    Initials Name Effective Dates Discipline    SA Gretchen Villa MS CCC-SLP 18 -  SLP          Outcome Summary         SLP GOALS     Row Name 20 1300             Oral Nutrition/Hydration Goal 1 (SLP)    Oral Nutrition/Hydration Goal 1, SLP  Pt will carmen PO without overt s/s of asp.  -SA      Time Frame (Oral Nutrition/Hydration Goal 1, SLP)  by discharge  -SA      Barriers (Oral Nutrition/Hydration Goal 1, SLP)  Re-Eval:  Pt demonstrated no overt s/s aspiration with thin liquids by cup, soft/hard solids, or mixed consistencies.  Pt with productive cough following thin by straw 2/2 trials.  Pt with independent large bolus size.  REC upgrade diet to regular diet with thin liquids, no straw.  Meds as tolerated.  Upright w/ all po.  -SA      Progress/Outcomes (Oral Nutrition/Hydration Goal 1, SLP)  good progress toward goal  -SA        User Key  (r) = Recorded By, (t) = Taken By, (c) = Cosigned By    Initials Name Provider Type    SA Gretchen Villa MS CCC-SLP Speech  and Language Pathologist          EDUCATION  The patient has been educated in the following areas:   Dysphagia (Swallowing Impairment).    SLP Recommendation and Plan                            SLP Outcome Measures (last 72 hours)      SLP Outcome Measures     Row Name 07/07/20 1500             SLP Outcome Measures    Outcome Measure Used?  Adult NOMS  -SA         Adult FCM Scores    Swallowing FCM Score  6  -SA        User Key  (r) = Recorded By, (t) = Taken By, (c) = Cosigned By    Initials Name Effective Dates    Gretchen Mitchell MS CCC-EDMUND 03/07/18 -              Time Calculation:   Time Calculation- SLP     Row Name 07/07/20 1504             Time Calculation- SLP    SLP Start Time  1300  -      SLP Received On  07/07/20  -        User Key  (r) = Recorded By, (t) = Taken By, (c) = Cosigned By    Initials Name Provider Type    Gretchen Mitchell MS CCC-SLP Speech and Language Pathologist          Therapy Charges for Today     Code Description Service Date Service Provider Modifiers Qty    [id number] HC ST TREATMENT SWALLOW 4 7/7/2020 Gretchen Villa MS CCC-EDMUND GN 1                 Gretchen Villa MS CCC-EDMUND  7/7/2020

## 2020-07-07 NOTE — DISCHARGE SUMMARY
PHYSICIAN DISCHARGE SUMMARY                                                                          [facility]    Patient Identification:  Name: Albert Tellez  Age: 65 y.o.  Sex: male  :  1955  MRN: [id number]  Primary Care Physician: Debra Narayan APRN    Admit date: 2020  Discharge date:2020  Discharged Condition: fair    Discharge Diagnoses:    DTs (delirium tremens) (CMS/HCC)   Acute metabolic encephalopathy  Acute alcohol withdrawal syndrome  Alcohol dependence/abuse  Acute alcoholic hepatitis  Acute hyponatremia  Acute hypokalemia  Anemia  Acute bronchitis  Former smoker  Hypertension    Hospital Course:   Patient admitted for DTs in setting of etoh abuse.  He required ICU stay.  He was evaluated by PT and subacute rehab was recommended however the patient and his wife refused.  Home with home health was also ordered by declined by patient and his wife.  Patient was seen by neurology and access center.  Patient and wife state their plan is to go to [facility] for ETOH counseling.   A walker was offered and was refused.    Consults:   IP CONSULT TO PULMONOLOGY  IP CONSULT TO ACCESS CENTER  IP CONSULT TO ACCESS CENTER    Consults:   IP CONSULT TO PULMONOLOGY  IP CONSULT TO ACCESS CENTER  IP CONSULT TO ACCESS CENTER    Significant Discharge Diagnostics   Procedures Performed:         Pertinent Lab Results:  Results from last 7 days   Lab Units 20  0637 20  0434 20  0454 20  0344 20  1634 20  0325 20  0314 20  0439   SODIUM mmol/L 137 138 140 135*  --  137 137 129*   POTASSIUM mmol/L 3.7 3.6 3.4* 3.8 3.7 3.3* 3.5 3.5   CHLORIDE mmol/L 102 102 106 104  --  101 103 97*   CO2 mmol/L 27.6 24.6 23.1 22.5  --  23.2 23.5 20.0*   BUN mg/dL 4* 5* 5* 6*  --  8 6* 6*   CREATININE mg/dL 0.73* 0.74* 0.60* 0.67*  --  0.67* 0.56* 0.61*   GLUCOSE mg/dL 82 94 95 96  --  85 109* 64*   CALCIUM  mg/dL 9.3 9.0 8.9 9.2  --  8.7 8.6 8.3*         Results from last 7 days   Lab Units 07/03/20  0325 07/02/20  0314 07/01/20  0439   WBC 10*3/mm3 4.09 5.53 5.51   HEMOGLOBIN g/dL 13.5 13.6 13.0   HEMATOCRIT % 37.5 40.0 36.6*   PLATELETS 10*3/mm3 205 150 132*   MCV fL 98.2* 103.9* 99.7*   MCH pg 35.3* 35.3* 35.4*   MCHC g/dL 36.0* 34.0 35.5   RDW % 11.2* 11.8* 11.4*   RDW-SD fl 40.8 45.2 41.3   MPV fL 9.7 9.8 10.1                   Invalid input(s): LDLCALC                      Results from last 7 days   Lab Units 07/02/20  0314   AMMONIA umol/L 42                       Imaging Results:  Imaging Results (All)     Procedure Component Value Units Date/Time    CT Head Without Contrast [[id number]] Collected:  06/28/20 0251     Updated:  07/01/20 0903    Narrative:       CT HEAD WITHOUT CONTRAST     HISTORY: Fall     COMPARISON: None available.     TECHNIQUE: Axial CT imaging was obtained through the brain. No IV  contrast was administered.     FINDINGS:  No acute intracranial hemorrhage is seen. There is atrophy. This is  somewhat advanced for the age of 65. There is periventricular and deep  white matter microangiopathic change. There is no midline shift or mass  effect. No calvarial fracture is seen. Mucosal thickening is noted  within the ethmoid sinuses and mastoid air cells appear clear.       Impression:       No acute intracranial findings.     Radiation dose reduction techniques were utilized, including automated  exposure control and exposure modulation based on body size.     This report was finalized on 6/28/2020 2:55 AM by Dr. Olinda Alejandra M.D.       XR Chest 2 View [[id number]] Collected:  06/28/20 1519     Updated:  06/28/20 1524    Narrative:       TWO-VIEW CHEST     HISTORY: Cough. Bronchitis.     FINDINGS: The lungs are moderately expanded and clear except for some  probable minimal atelectasis at the left base.. The heart is mildly  enlarged.     This report was finalized on 6/28/2020 3:20 PM by   "Pantera Melton M.D.             Discharge Exam:  /89 (BP Location: Left arm, Patient Position: Sitting)   Pulse 85   Temp 98.5 °F (36.9 °C) (Oral)   Resp 18   Ht 170.2 cm (67.01\")   Wt 81.7 kg (180 lb 1.9 oz)   SpO2 99%   BMI 28.20 kg/m²   General appearance: non toxic, conversant   Eyes: anicteric sclerae, moist conjunctivae; no lid-lag; PERRLA  HENT: Atraumatic; oropharynx clear with moist mucous membranes and no mucosal ulcerations; normal hard and soft palate  Neck: Trachea midline; FROM, supple, no thyromegaly or lymphadenopathy  Lungs: CTA, with normal respiratory effort and no intercostal retractions  CV: RRR, no MRGs   Abdomen: Soft, non-tender; no masses or HSM  Extremities: No peripheral edema or extremity lymphadenopathy  Skin: Normal temperature, turgor and texture; no rash, ulcers or subcutaneous nodules  Psych: Appropriate affect, alert and oriented to person, place and time  Discharge Disposition  Home-Health Care Svc    Discharge Medications     Discharge Medications      New Medications      Instructions Start Date   ferrous sulfate 325 (65 FE) MG tablet   325 mg, Oral, 2 Times Daily With Meals      lisinopril 10 MG tablet  Commonly known as:  PRINIVIL,ZESTRIL   10 mg, Oral, Every 24 Hours Scheduled   Start Date:  July 8, 2020        Continue These Medications      Instructions Start Date   doxycycline 100 MG capsule  Commonly known as:  VIBRAMYCIN   100 mg, Oral, Daily, AS NEEDED FOR OUTBREAK OF ROSACEA             Discharge Diet: resume preadmission diet    Activity at Discharge: suggest up with assistance.     Contact information for follow-up providers     Debra Narayan APRN Follow up.    Specialty:  Nurse Practitioner  Contact information:  [address] [place] [address] [phone]             [facility] [place] [facility] .    Specialty:  Home Health Services  Contact information:  [address] " [place] [address]                 Contact information for after-discharge care     Destination     [facility] .    Service:  Skilled Nursing  Contact information:  [address] [facility] [address] [phone]                             Total time spent discharging patient including evaluation, medication reconciliation, arranging follow up, and post hospitalization instructions and education total time exceeds 30 minutes.    Signed:  Blayne Chin MD  7/7/2020  17:55

## 2020-07-07 NOTE — PLAN OF CARE
Problem: Fall Risk (Adult)  Goal: Identify Related Risk Factors and Signs and Symptoms  Outcome: Outcome(s) achieved  Goal: Absence of Fall  Outcome: Outcome(s) achieved     Problem: Skin Injury Risk (Adult)  Goal: Identify Related Risk Factors and Signs and Symptoms  Outcome: Outcome(s) achieved  Goal: Skin Health and Integrity  Outcome: Outcome(s) achieved     Problem: Restraint, Nonbehavioral (Nonviolent)  Goal: Rationale and Justification  Outcome: Outcome(s) achieved  Goal: Nonbehavioral (Nonviolent) Restraint: Absence of Injury/Harm  Outcome: Outcome(s) achieved  Goal: Nonbehavioral (Nonviolent) Restraint: Achievement of Discontinuation Criteria  Outcome: Outcome(s) achieved  Goal: Nonbehavioral (Nonviolent) Restraint: Preservation of Dignity and Wellbeing  Outcome: Outcome(s) achieved     Problem: Patient Care Overview  Goal: Plan of Care Review  Outcome: Outcome(s) achieved  Goal: Individualization and Mutuality  Outcome: Outcome(s) achieved  Goal: Discharge Needs Assessment  Outcome: Outcome(s) achieved  Goal: Interprofessional Rounds/Family Conf  Outcome: Outcome(s) achieved     Problem: Alcohol Withdrawal Acute, Risk/Actual (Adult)  Goal: Signs and Symptoms of Listed Potential Problems Will be Absent, Minimized or Managed (Alcohol Withdrawal Acute, Risk/Actual)  Outcome: Outcome(s) achieved     Problem: Nutrition, Imbalanced: Inadequate Oral Intake (Adult)  Goal: Identify Related Risk Factors and Signs and Symptoms  Outcome: Outcome(s) achieved  Goal: Improved Oral Intake  Outcome: Outcome(s) achieved  Goal: Prevent Further Weight Loss  Outcome: Outcome(s) achieved

## 2020-07-07 NOTE — PROGRESS NOTES
Continued Stay Note  [facility]     Patient Name: Albert Tellez  MRN: [id number]  Today's Date: 7/7/2020    Admit Date: 6/27/2020    Discharge Plan     Row Name 07/07/20 2492       Plan    Plan  Home     Plan Comments  Spouse is here to transport.  She plans to take him home and then to [facility].  She declines home health to be set up.  She declines walker.  States that the patient has one from a previous hip fracture.  Patient declines needs.  ..............................Chelly Sorto RN    Row Name 07/07/20 1352       Plan    Plan  [facility] has skilled rehab pre-cert vs home     Plan Comments  Incoming call from Fabienne Tellez [phone].  She states she told staff last night that the she does not want to take her spouse to subacute rehab.  She states he walked in the room and brushed his teeth.  She wants to take him to [facility] inpatient ETOH program that he was admitted from.  She was upset that staff did not pass along information from their long discussion last night when she visited.  Explained that dc orders are anticipated and that pre-cert has been obtained for a safe dc plan.  She can take the patient home at DC if she wants.  Covid test for skilled rehab has been sent and is pending.  Spoke with Ni at [place].  She states that the pre-cert will need to be redone if she does not bring him today and that she needs the Covid results prior admission to [facility] subacute.  ................................Chelly Sorto RN        Discharge Codes    No documentation.       Expected Discharge Date and Time     Expected Discharge Date Expected Discharge Time    Jul 7, 2020             Chelly Sorto RN

## 2020-07-07 NOTE — PROGRESS NOTES
Continued Stay Note  [facility]     Patient Name: Albert Tellez  MRN: [id number]  Today's Date: 7/7/2020    Admit Date: 6/27/2020    Discharge Plan     Row Name 07/07/20 1230       Plan    Plan  [facility] [place] pre-cert obtained    Plan Comments  Spoke with Ni with [facility].  The facility has obtained the [place] pre-cert for skilled rehab.  Anticipate DC orders today.   for spouse, Fabienne Tellez [phone].  ...........................Chelly Sorto RN        Discharge Codes    No documentation.             Chelly Sorto RN

## 2020-07-07 NOTE — PROGRESS NOTES
Continued Stay Note  [facility]     Patient Name: Albert Tellez  MRN: [id number]  Today's Date: 7/7/2020    Admit Date: 6/27/2020    Discharge Plan     Row Name 07/07/20 1352       Plan    Plan  [facility] has skilled rehab pre-cert vs home     Plan Comments  Incoming call from Fabienne Tellez [phone].  She states she told staff last night that the she does not want to take her spouse to subacute rehab.  She states he walked in the room and brushed his teeth.  She wants to take him to [facility] program that he was admitted from.  She was upset that staff did not pass along information from their long discussion last night when she visited.  Explained that dc orders are anticipated and that pre-cert has been obtained for a safe dc plan.  She can take the patient home at [place] if she wants.  Covid test for skilled rehab has been sent and is pending.  Spoke with Ni at [place].  She states that the pre-cert will need to be redone if she does not bring him today and that she needs the Covid results prior admission to [facility] subacute.  ................................Chelly Sorto RN    Row Name 07/07/20 1230       Plan    Plan  [facility] [place] pre-cert obtained    Plan Comments  Spoke with Ni with [facility].  The facility has obtained the [place] pre-cert for skilled rehab.  Anticipate DC orders today.  VM for spouse, Fabienne Geoff [phone].  ...........................Chelly Sorto RN        Discharge Codes    No documentation.             Chelly Sorto RN

## 2020-07-07 NOTE — PROGRESS NOTES
"  Daily Progress Note.   [address]  7/7/2020    Patient:  Name:  Albert Tellez  MRN:  [id number]  1955  65 y.o.  male         CC/ Reason for visit: Acute alcohol withdrawal syndrome, alcoholic hepatitis, weak       Interval History:  Patient asleep upon entry to room. He awakens asks questions gregarding when he can leave  No soa no chest pain.  No cough.  Working with pt    ROS: No fever, no diarrhea, no chest pain  PMFSSH: no change    Physical Exam:  /82 (BP Location: Left arm, Patient Position: Sitting)   Pulse 90   Temp 97.6 °F (36.4 °C) (Oral)   Resp 18   Ht 170.2 cm (67.01\")   Wt 81.7 kg (180 lb 1.9 oz)   SpO2 99%   BMI 28.20 kg/m²   Body mass index is 28.2 kg/m².    Intake/Output Summary (Last 24 hours) at 7/7/2020 1111  Last data filed at 7/7/2020 0557  Gross per 24 hour   Intake 540 ml   Output 725 ml   Net -185 ml     General appearance: non toxic, conversant   Eyes: anicteric sclerae, moist conjunctivae; no lid-lag; PERRLA  HENT: Atraumatic; oropharynx clear with moist mucous membranes and no mucosal ulcerations; normal hard and soft palate  Neck: Trachea midline; FROM, supple, no thyromegaly or lymphadenopathy  Lungs: CTA, with normal respiratory effort and no intercostal retractions  CV: RRR, no MRGs   Abdomen: Soft, non-tender; no masses or HSM  Extremities: No peripheral edema or extremity lymphadenopathy  Skin: Normal temperature, turgor and texture; no rash, ulcers or subcutaneous nodules  Psych: Appropriate affect, alert and oriented to person, place and time    Data Review:  Notable Labs:  Results from last 7 days   Lab Units 07/03/20  0325 07/02/20  0314 07/01/20  0439   WBC 10*3/mm3 4.09 5.53 5.51   HEMOGLOBIN g/dL 13.5 13.6 13.0   PLATELETS 10*3/mm3 205 150 132*     Results from last 7 days   Lab Units 07/07/20  0637 07/06/20  0434 07/05/20  0454 07/04/20  0344 07/03/20  1634 07/03/20  0325 07/02/20  0314 07/01/20  0439   SODIUM mmol/L 137 138 140 " 135*  --  137 137 129*   POTASSIUM mmol/L 3.7 3.6 3.4* 3.8 3.7 3.3* 3.5 3.5   CHLORIDE mmol/L 102 102 106 104  --  101 103 97*   CO2 mmol/L 27.6 24.6 23.1 22.5  --  23.2 23.5 20.0*   BUN mg/dL 4* 5* 5* 6*  --  8 6* 6*   CREATININE mg/dL 0.73* 0.74* 0.60* 0.67*  --  0.67* 0.56* 0.61*   GLUCOSE mg/dL 82 94 95 96  --  85 109* 64*   CALCIUM mg/dL 9.3 9.0 8.9 9.2  --  8.7 8.6 8.3*   PHOSPHORUS mg/dL 3.6 4.0 3.4 2.9  --  3.2 3.2 2.5   Estimated Creatinine Clearance: 104.5 mL/min (A) (by C-G formula based on SCr of 0.73 mg/dL (L)).    Results from last 7 days   Lab Units 07/03/20  0325 07/02/20  0314 07/01/20  0439   PLATELETS 10*3/mm3 205 150 132*             Imaging:  Reviewed chest images personally from past 3 days    Scheduled meds:      arformoterol 15 mcg Nebulization BID - RT   cloNIDine 0.1 mg Oral Q8H   enoxaparin 40 mg Subcutaneous Q24H   ferrous sulfate 325 mg Oral BID With Meals   lisinopril 10 mg Oral Q24H   pantoprazole 40 mg Oral Q AM   sodium chloride 10 mL Intravenous Q12H       ASSESSMENT  /  PLAN:  Acute metabolic encephalopathy  Acute alcohol withdrawal syndrome  Alcohol dependence/abuse  Acute alcoholic hepatitis  Fall x2  Acute hyponatremia  Acute hypokalemia  Anemia  Acute bronchitis  Former smoker  Hypertension      Cont clonidine  Cont lisinopril    Neurology consultation reviewed, appreciated.      Cont work with PT  To SNF when precert obtained.    Await precert dc when available        Blayne Chin MD  [facility]  07/07/20  1112

## 2020-07-08 ENCOUNTER — READMISSION MANAGEMENT (OUTPATIENT)
Dept: CALL CENTER | Facility: HOSPITAL | Age: 65
End: 2020-07-08

## 2020-07-08 NOTE — OUTREACH NOTE
Prep Survey      Responses   [facility] facility patient discharged from?  [place]   Is LACE score < 7 ?  No   Eligibility  Not Eligible   What are the reasons patient is not eligible?  [facility]   Does the patient have one of the following disease processes/diagnoses(primary or secondary)?  Other   Prep survey completed?  Yes          Marsha Hutson RN

## 2021-01-04 ENCOUNTER — OFFICE VISIT (OUTPATIENT)
Dept: NEUROLOGY | Facility: CLINIC | Age: 66
End: 2021-01-04

## 2021-01-04 VITALS
HEIGHT: 67 IN | SYSTOLIC BLOOD PRESSURE: 160 MMHG | BODY MASS INDEX: 25.85 KG/M2 | WEIGHT: 164.7 LBS | OXYGEN SATURATION: 97 % | HEART RATE: 84 BPM | DIASTOLIC BLOOD PRESSURE: 98 MMHG

## 2021-01-04 DIAGNOSIS — R41.3 MEMORY LOSS: Primary | ICD-10-CM

## 2021-01-04 PROCEDURE — 99215 OFFICE O/P EST HI 40 MIN: CPT | Performed by: PSYCHIATRY & NEUROLOGY

## 2021-01-04 RX ORDER — POLYETHYLENE GLYCOL 3350, SODIUM SULFATE ANHYDROUS, SODIUM BICARBONATE, SODIUM CHLORIDE, POTASSIUM CHLORIDE 236; 22.74; 6.74; 5.86; 2.97 G/4L; G/4L; G/4L; G/4L; G/4L
POWDER, FOR SOLUTION ORAL
COMMUNITY
Start: 2020-10-26

## 2021-01-04 RX ORDER — PANTOPRAZOLE SODIUM 40 MG/1
40 TABLET, DELAYED RELEASE ORAL 2 TIMES DAILY
COMMUNITY
Start: 2020-10-28

## 2021-01-04 RX ORDER — TOPIRAMATE 25 MG/1
TABLET ORAL
COMMUNITY
Start: 2020-11-17

## 2021-01-04 NOTE — PROGRESS NOTES
CC: Dementia    HPI:  Albert Tellez is a  65 y.o.  right-handed white male who I am seeing for the first time as a follow-up regarding dementia.  He was seen initially by Dr. Villanueva while the patient was hospitalized with alcohol withdrawal and confusion.  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  Patient indicated he was treated with vitamins and continues to take a multiple vitamin.  He has had notable improvements particularly with respect to gait.  He went through inpatient alcohol detoxification and has been sober since he stopped 6/24/2020.  He requested Topamax 25 mg daily since he knew it was used for met detox and he states that basically the next day he did not have any desire to drink.    The patient states regarding his memory the only thing different prior to his alcohol withdrawal was that some recent events he does not recall such as whether or not he ran a .  This is not daily.  Also feeding the birds which she does at 8:00 every morning sometimes later in the day he does not recall if he did or did not feed them.  He states that he remains independent living with his wife.  There was some question he had enough cognitive difficulties that he may need assisted living according to the note from Dr. Villanueva back in June.  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    He has history of a concussion in an industrial accident where he fell striking his steel wall of his head at age 45.  He states he was knocked out a few minutes.  He also has history of a hip fracture which was not initially in need of surgery but subsequently required a hip replacement.  He denies history of meningitis, seizures, stroke or syncope.  There is positive family history of  Alzheimer's disease in his maternal grandmother who  of it with onset in her late 70s.  He indicates his mother is still alive without dementia.  A maternal sister has Parkinson's disease developing in her mid 70s and his maternal grandfather had a stroke in his 60s but had a lot of vascular risk factors.  No one in the family with a brain tumor or brain hemorrhage described.        Past Medical History:   Diagnosis Date   • Hearing loss     JEREMI HEARING AIDS   • History of melanoma     REMOVED FROM RIGHT TEMPLE   • Hypertension     IN THE PAST. RESOLVED. NO MEDS CURRENTLY   • Rosacea    • Skin cancer     MELANOMA REMOVED FROM RIGHT TEMPLE   • Spinal stenosis, lumbar     L3-4, L4-5         Past Surgical History:   Procedure Laterality Date   • ARM LACERATION REPAIR Left    • LUMBAR LAMINECTOMY DISCECTOMY DECOMPRESSION N/A 2018    Procedure: L3-4,4-5 laminectomy;  Surgeon: Shane Ridley MD;  Location: [facility];  Service: Orthopedic Spine   • TONSILLECTOMY     • TOTAL HIP ARTHROPLASTY Left 2012   • WISDOM TOOTH EXTRACTION             Current Outpatient Medications:   •  doxycycline (VIBRAMYCIN) 100 MG capsule, Take 100 mg by mouth Daily. AS NEEDED FOR OUTBREAK OF ROSACEA, Disp: , Rfl:   •  lisinopril (PRINIVIL,ZESTRIL) 10 MG tablet, Take 1 tablet by mouth Daily., Disp: 30 tablet, Rfl: 0  •  pantoprazole (PROTONIX) 40 MG EC tablet, Take 40 mg by mouth 2 (Two) Times a Day., Disp: , Rfl:   •  topiramate (TOPAMAX) 25 MG tablet, TK 1 T PO HS, Disp: , Rfl:   •  ferrous sulfate 325 (65 FE) MG tablet, Take 1 tablet by mouth 2 (Two) Times a Day With Meals., Disp: 60 tablet, Rfl: 0  •  PEG 3350-KCl-NaBcb-NaCl-NaSulf (PEG-3350/Electrolytes) 236 g reconstituted solution, MIX AND DRINK UTD, Disp: , Rfl:       Family History   Problem Relation Age of Onset   • Hypertension Other    • Heart disease Father    • Melanoma Father    • Alzheimer's disease Maternal Grandmother    • Malig Hyperthermia Neg Hx   "        Social History     Socioeconomic History   • Marital status:      Spouse name: Not on file   • Number of children: Not on file   • Years of education: Not on file   • Highest education level: Not on file   Tobacco Use   • Smoking status: Former Smoker     Quit date:      Years since quittin.0   • Smokeless tobacco: Never Used   Substance and Sexual Activity   • Alcohol use: No     Frequency: Never   • Drug use: No   • Sexual activity: Defer         No Known Allergies      Pain Scale: 0/10        ROS:  Review of Systems   Constitutional: Positive for activity change. Negative for appetite change and fatigue.   HENT: Negative for ear pain, facial swelling and hearing loss.    Eyes: Negative for pain, redness and itching.   Respiratory: Negative for cough, choking and shortness of breath.    Cardiovascular: Negative for chest pain and leg swelling.   Gastrointestinal: Negative for abdominal pain, nausea and vomiting.   Endocrine: Negative for cold intolerance and heat intolerance.   Musculoskeletal: Negative for arthralgias, back pain and joint swelling.   Skin: Negative for color change, pallor and rash.   Allergic/Immunologic: Negative for environmental allergies and food allergies.   Neurological: Negative for dizziness, tremors, seizures, syncope, facial asymmetry, speech difficulty, weakness, light-headedness, numbness and headaches.   Psychiatric/Behavioral: Negative for agitation, behavioral problems, confusion, decreased concentration, dysphoric mood, hallucinations, self-injury, sleep disturbance and suicidal ideas. The patient is not nervous/anxious and is not hyperactive.          I have reviewed and agree with the above ROS completed by the medical assistant.      Physical Exam:  Vitals:    21 1307   BP: 160/98   Pulse: 84   SpO2: 97%   Weight: 74.7 kg (164 lb 11.2 oz)   Height: 170.2 cm (67.01\")     Orthostatic BP:    Body mass index is 25.79 kg/m².    Physical Exam  General: " Well-developed white male no acute distress  HEENT: Normocephalic no evidence of trauma.  Discs flat.  No AV nicking.  Throat negative  Neck: Supple.  No thyromegaly.  No cervical bruits.  Radial pulses strong and simultaneous  Heart: Regular rate and rhythm no murmurs.  No pedal edema  Extremities: Radial pulses were strong and simultaneous      Neurological Exam:   Mental Status: Awake, alert, oriented 10/10 on Mini-Mental state.  Conversant without evidence of an affective disorder, thought disorder, delusions or hallucinations.  Attention span and concentration are normal.  HCF: No aphasia, apraxia or dysarthria.  Delayed recall was 2/3 in 3 minutes on Mini-Mental state..  Knowledge of recent events intact.  Clock draw 4/4.  MMSE 28/30 with one half for delayed recall and 1 off for drawing the intersecting pentagons (several corners were not joined although the figure otherwise was acceptable).  The patient named 14 animals in 1 minute  CN: I:   II: Visual fields full without left inattention   III, IV, VI: Eye movements intact without nystagmus or ptosis.  Pupils equal  round and reactive to light.   V,VII: Light touch and pinprick intact all 3 divisions of V.  Facial muscles symmetrical.   VIII: Hearing intact to finger rub   IX,X: Soft palate elevates symmetrically   XI: Sternomastoid and trapezius are strong.   XII: Tongue midline without atrophy or fasciculations  Motor: Normal tone and bulk in the upper and lower extremities   Power testing: Full power in all muscles tested arms and legs  Reflexes: Upper extremities: +1 diffusely        Lower extremities: +1 diffusely        Toe signs: Downgoing bilaterally  Sensory: Light touch: Diffusely intact upper and lower extremities        Pinprick: Diffusely intact upper and lower extremities        Vibration: Intact at the ankle        Position: Intact at the great toes    Cerebellar: Finger-to-nose: Normal           Rapid movement: Normal           Heel-to-shin:  Normal  Gait and Station: Normal casual, toe, heel and tandem walk.  No Romberg no drift    Results:      Lab Results   Component Value Date    GLUCOSE 82 07/07/2020    BUN 4 (L) 07/07/2020    CREATININE 0.73 (L) 07/07/2020    EGFRIFNONA 108 07/07/2020    EGFRIFAFRI 138 06/27/2020    BCR 5.5 (L) 07/07/2020    CO2 27.6 07/07/2020    CALCIUM 9.3 07/07/2020    ALBUMIN 3.50 07/07/2020     (H) 06/28/2020    ALT 71 (H) 06/28/2020       Lab Results   Component Value Date    WBC 4.09 07/03/2020    HGB 13.5 07/03/2020    HCT 37.5 07/03/2020    MCV 98.2 (H) 07/03/2020     07/03/2020         .No results found for: RPR      No results found for: TSH, X3CYAWJ, W5JJKUJ, THYROIDAB      No results found for: TQZSRQPL89      No results found for: FOLATE      No results found for: HGBA1C      Lab Results   Component Value Date    GLUCOSE 82 07/07/2020    BUN 4 (L) 07/07/2020    CREATININE 0.73 (L) 07/07/2020    EGFRIFNONA 108 07/07/2020    EGFRIFAFRI 138 06/27/2020    BCR 5.5 (L) 07/07/2020    K 3.7 07/07/2020    CO2 27.6 07/07/2020    CALCIUM 9.3 07/07/2020    ALBUMIN 3.50 07/07/2020     (H) 06/28/2020    ALT 71 (H) 06/28/2020         Lab Results   Component Value Date    WBC 4.09 07/03/2020    HGB 13.5 07/03/2020    HCT 37.5 07/03/2020    MCV 98.2 (H) 07/03/2020     07/03/2020       CT scan of the brain films were reviewed demonstrating mild diffuse atrophy with question of added atrophy in the rostral vermis      Assessment:   1.  MCI associated with chronic alcohol use now sober 6 months with slow improvement in symptoms.  2.  Acute ataxia-significant improvement once he detoxed.  I do not think he really has rostral vermis syndrome clinically at this point          Plan:  1.  I looked back at lab tests and did not find a sed rate, RPR, B12 and folate or thyroid functions.  I told him I could order them but he stated he would have lab tests from his primary  when he sees her again soon.  I told him to  notify her that to complete the evaluation of memory loss he should have these tests which could [facility] gets his [facility] work through.  I told him I would send Debra Narayan NP this note.  2.  He can continue to follow-up with his primary physician.  I told him he should return should he have noticeable worsening of his cognitive status            >50% of this 40-minute follow-up was spent counseling the patient regarding the recommendation of continued sobriety and vitamin supplementation              Dictated utilizing Dragon dictation.

## 2021-03-19 ENCOUNTER — BULK ORDERING (OUTPATIENT)
Dept: CASE MANAGEMENT | Facility: OTHER | Age: 66
End: 2021-03-19

## 2021-03-19 DIAGNOSIS — Z23 IMMUNIZATION DUE: ICD-10-CM

## 2022-11-01 ENCOUNTER — OFFICE VISIT (OUTPATIENT)
Dept: ORTHOPEDIC SURGERY | Facility: CLINIC | Age: 67
End: 2022-11-01

## 2022-11-01 ENCOUNTER — LAB (OUTPATIENT)
Dept: LAB | Facility: HOSPITAL | Age: 67
End: 2022-11-01

## 2022-11-01 VITALS — WEIGHT: 150.3 LBS | HEIGHT: 67 IN | BODY MASS INDEX: 23.59 KG/M2 | TEMPERATURE: 97.9 F

## 2022-11-01 DIAGNOSIS — Z96.649 S/P TOTAL HIP RESURFACING: ICD-10-CM

## 2022-11-01 DIAGNOSIS — Z96.649 S/P TOTAL HIP RESURFACING: Primary | ICD-10-CM

## 2022-11-01 PROCEDURE — 73502 X-RAY EXAM HIP UNI 2-3 VIEWS: CPT | Performed by: NURSE PRACTITIONER

## 2022-11-01 PROCEDURE — 82495 ASSAY OF CHROMIUM: CPT

## 2022-11-01 PROCEDURE — 83018 HEAVY METAL QUAN EACH NES: CPT

## 2022-11-01 PROCEDURE — 99203 OFFICE O/P NEW LOW 30 MIN: CPT | Performed by: NURSE PRACTITIONER

## 2022-11-01 PROCEDURE — 36415 COLL VENOUS BLD VENIPUNCTURE: CPT

## 2022-11-01 RX ORDER — TAMSULOSIN HYDROCHLORIDE 0.4 MG/1
CAPSULE ORAL
COMMUNITY
Start: 2022-08-25

## 2022-11-01 RX ORDER — TRAZODONE HYDROCHLORIDE 50 MG/1
TABLET ORAL
COMMUNITY
Start: 2022-06-03

## 2022-11-01 NOTE — PROGRESS NOTES
"Albert Tellez : 1955 MRN: [id number] DATE: 2022    CHIEF COMPLAINT:  Follow up left total hip - BHR    SUBJECTIVE:Patient returns today for a genearl follow up of left total hip resurfacing. Patient reports his surgery was approximately 11 years ago in  and was doing well up until the last month.  Patient states approximately 1 month ago he started having pain to the posterior lateral portion of his hip that has progressively worsened.  Patient states the pain is constant and daily and is exacerbated by prolonged walking.  Patient describes the pain as a deep ache.  He denies any recent injury to his hip.  He states he has tried conservative measures of ibuprofen in which that does seem to help alleviate some of his pain.  Patient does have a history of lower back issues in which he has seen Dr. Ridley and had to have a laminectomy in 2018.  Patient states his pain feels similar but different.  He denies any signs or symptoms of infection, and he is without any other significant complaints today.    OBJECTIVE:    Temp 97.9 °F (36.6 °C)   Ht 170.2 cm (67\")   Wt 68.2 kg (150 lb 4.8 oz)   BMI 23.54 kg/m²   Family History   Problem Relation Age of Onset   • Hypertension Other    • Heart disease Father    • Melanoma Father    • Alzheimer's disease Maternal Grandmother    • Malig Hyperthermia Neg Hx      Past Medical History:   Diagnosis Date   • Fracture of hip (HCC)    • Hearing loss     JEREMI HEARING AIDS   • History of melanoma     REMOVED FROM RIGHT TEMPLE   • Hypertension     IN THE PAST. RESOLVED. NO MEDS CURRENTLY   • Rosacea    • Skin cancer     MELANOMA REMOVED FROM RIGHT TEMPLE   • Spinal stenosis, lumbar     L3-4, L4-5     Past Surgical History:   Procedure Laterality Date   • ARM LACERATION REPAIR Left    • BACK SURGERY     • JOINT REPLACEMENT     • LUMBAR LAMINECTOMY DISCECTOMY DECOMPRESSION N/A 2018    Procedure: L3-4,4-5 laminectomy;  Surgeon: Shane Ridley MD;  Location: " [facility];  Service: Orthopedic Spine   • TONSILLECTOMY     • TOTAL HIP ARTHROPLASTY Left 2012   • WISDOM TOOTH EXTRACTION       Social History     Socioeconomic History   • Marital status:    Tobacco Use   • Smoking status: Former     Types: Cigarettes     Start date: 1971     Quit date: 2000     Years since quittin.8   • Smokeless tobacco: Never   Substance and Sexual Activity   • Alcohol use: No   • Drug use: No   • Sexual activity: Yes     Partners: Female     Birth control/protection: Vasectomy       Review of Systems: 14 point review of systems performed pertinent positives and negatives discussed above, all other systems are negative    Exam:. The incision is well healed. Range of motion is good without irritability. The calf is soft and nontender with a negative Homans sign. Alignment is neutral. Leg lengths are equal. Good hip flexion and abduction strength.Walks with nonantalgic gait. Intact to light touch with palpable distal pulses.     DIAGNOSTIC STUDIES  Xrays:Xrays 2 view left hip AP and lateral were ordered and reviewed for follow up of total joint which demonstrate a well positioned BHR without significant complicating factors. No other X-rays were available for comparison views.    ASSESSMENT:   Follow up left hip replacement       PLAN:    Treatment options as well as imaging results were discussed in detail with the patient.  At this point in time Dr. Hernandez would like to proceed forward with conservative measures.  Dr. Hernandez wants to order a serum cobalt and chromium level for further evaluation for potential metallosis.  He also would like to order an MRI of the left hip with metal suppression protocol to evaluate potential loosening.  Once we get these results back Dr. Hernandez will review them and we will call the patient back to discuss them as well as further treatment recommendations.  Patient voices understanding satisfaction of her plan today.      Sarmad  Tamiko, APRN  11/1/2022     This patient was seen in conjunction today with Dr. Duncan Hernandez.  Dr. Hernandez agrees with the above-stated assessment and plan.

## 2022-11-03 LAB — COBALT SERPL-MCNC: 1.7 UG/L (ref 0–0.9)

## 2022-11-10 LAB — CR SERPL-MCNC: 0.4 UG/L (ref 0.1–2.1)

## 2022-12-13 ENCOUNTER — HOSPITAL ENCOUNTER (OUTPATIENT)
Dept: MRI IMAGING | Facility: HOSPITAL | Age: 67
Discharge: HOME | End: 2022-12-13
Admitting: NURSE PRACTITIONER

## 2022-12-13 DIAGNOSIS — Z96.649 S/P TOTAL HIP RESURFACING: ICD-10-CM

## 2022-12-13 PROCEDURE — 73721 MRI JNT OF LWR EXTRE W/O DYE: CPT

## 2022-12-20 NOTE — PROGRESS NOTES
Someone please schedule this patient a follow up appointment with me at one of my next available appointments to discuss these results as well as further treatment recommendations.

## 2023-01-19 ENCOUNTER — OFFICE VISIT (OUTPATIENT)
Dept: ORTHOPEDIC SURGERY | Facility: CLINIC | Age: 68
End: 2023-01-19
Payer: MEDICARE

## 2023-01-19 VITALS — RESPIRATION RATE: 16 BRPM | WEIGHT: 150 LBS | TEMPERATURE: 98.6 F | BODY MASS INDEX: 23.54 KG/M2 | HEIGHT: 67 IN

## 2023-01-19 DIAGNOSIS — Z96.649 S/P TOTAL HIP RESURFACING: Primary | ICD-10-CM

## 2023-01-19 DIAGNOSIS — M54.16 LUMBAR RADICULOPATHY: ICD-10-CM

## 2023-01-19 PROCEDURE — 99214 OFFICE O/P EST MOD 30 MIN: CPT | Performed by: NURSE PRACTITIONER

## 2023-01-19 RX ORDER — TADALAFIL 5 MG/1
5 TABLET ORAL DAILY
COMMUNITY
Start: 2023-01-10 | End: 2023-02-09

## 2023-01-19 NOTE — PROGRESS NOTES
"Albert Tellez : 1955 MRN: [id number] DATE: 2023    CHIEF COMPLAINT:  Follow up left total hip resurfacing    SUBJECTIVE:Patient returns today for a follow up of left total hip resurfacing. Patient was previously seen 2022 with complaints of acute onset of pain to his left hip.  Patient was sent for an MRI of the left hip to evaluate his implant for loosening or periprosthetic fracture.  Imaging results show the patient just had small bursal fluid collection.  Patient states now his symptoms are from his left flank that radiate down his left thigh.  Patient reports he has known back history in which he seen Dr. Ridley and had to have a laminectomy from L3-L4 and L4-L5.  Patient reports his symptoms feel similar to what his back pain was then.  Patient reports that his symptoms are brought on by prolonged walking.  He states he is an avid bicycler and the symptoms do not bother him while he is bicycling.  Patient denies any recent injury.  And he is without any other significant complaints today.    OBJECTIVE:    Temp 98.6 °F (37 °C) (Temporal)   Resp 16   Ht 170.2 cm (67.01\")   Wt 68 kg (150 lb)   BMI 23.49 kg/m²   Family History   Problem Relation Age of Onset   • Hypertension Other    • Heart disease Father    • Melanoma Father    • Alzheimer's disease Maternal Grandmother    • Malig Hyperthermia Neg Hx      Past Medical History:   Diagnosis Date   • Fracture of hip (HCC)    • Hearing loss     JEREMI HEARING AIDS   • History of melanoma     REMOVED FROM RIGHT TEMPLE   • Hypertension     IN THE PAST. RESOLVED. NO MEDS CURRENTLY   • Rosacea    • Skin cancer     MELANOMA REMOVED FROM RIGHT TEMPLE   • Spinal stenosis, lumbar     L3-4, L4-5     Past Surgical History:   Procedure Laterality Date   • ARM LACERATION REPAIR Left    • BACK SURGERY     • JOINT REPLACEMENT     • LUMBAR LAMINECTOMY DISCECTOMY DECOMPRESSION N/A 2018    Procedure: L3-4,4-5 laminectomy;  Surgeon: Khai" Shane SANDOVAL MD;  Location: [facility] OR;  Service: Orthopedic Spine   • TONSILLECTOMY     • TOTAL HIP ARTHROPLASTY Left 2012   • WISDOM TOOTH EXTRACTION       Social History     Socioeconomic History   • Marital status:    Tobacco Use   • Smoking status: Former     Types: Cigarettes     Start date: 1971     Quit date: 2000     Years since quittin.0   • Smokeless tobacco: Never   Substance and Sexual Activity   • Alcohol use: No   • Drug use: No   • Sexual activity: Yes     Partners: Female     Birth control/protection: Vasectomy       Review of Systems: 14 point review of systems performed pertinent positives and negatives discussed above, all other systems are negative    Exam:. The incision is well healed. Range of motion is good without irritability. The calf is soft and nontender with a negative Homans sign. Alignment is neutral. Leg lengths are equal. Good hip flexion and abduction strength.Walks with nonantalgic gait. Intact to light touch with palpable distal pulses.     DIAGNOSTIC STUDIES  Xrays: No new x-rays were taken today    MRI LEFT HIP WITHOUT CONTRAST WITH METAL ARTIFACT REDUCTION PROTOCOL     HISTORY: Increasing left lateral hip pain.     TECHNIQUE: MRI left hip with metal artifact reduction protocol includes  coronal STIR, T1, T2 and axial PD weighted sequences through both hips,  as well as axial, coronal and sagittal PD weighted sequences through the  left hip.     COMPARISON: Left hip postoperative exam 2012.     FINDINGS: There has been left hip resurfacing and there is no evidence  for periprosthetic fracture or osteolysis or acute abnormality. There is  no fluid distention of the capsule. There is a fluid collection adjacent  to the left greater trochanter and extending deep to the left iliotibial  tract that extends 1.9 x 1.6 x 5.4 cm. No iliotibial tract tear is  demonstrated. Bone marrow and cortical signal within the adjacent  greater trochanter is normal.  Hip musculature appears symmetric and is  within normal limits. No pelvic hernia formation is demonstrated.     IMPRESSION:  1. Left greater trochanteric bursal collection measuring 5.4 cm in  length consistent with trochanteric bursitis.  2. Left hip resurfacing without complicating feature.    ASSESSMENT:   Follow up left hip resurfacing with radicular symptoms       PLAN:      Treatment options as well as previous imaging results were discussed in detail with the patient.  Based off patient's physical examinations and symptoms this is likely a result from his lumbar spine.  I did also consult with Dr. Hernandez on the case and he agrees that based off the patient's physical examination as well as imaging results it is unlikely that the source of his pain is coming from his hip.  Patient is a known patient of Dr. Ridley therefore we will have him follow-up with his nurse practitioner Flavia Lopez for further evaluation.    Sarmad Morrison, APRN  1/19/2023

## 2023-02-06 ENCOUNTER — TELEPHONE (OUTPATIENT)
Dept: ORTHOPEDIC SURGERY | Facility: CLINIC | Age: 68
End: 2023-02-06
Payer: COMMERCIAL

## 2023-02-17 ENCOUNTER — TELEPHONE (OUTPATIENT)
Dept: ORTHOPEDIC SURGERY | Facility: CLINIC | Age: 68
End: 2023-02-17
Payer: COMMERCIAL

## 2023-02-17 ENCOUNTER — OFFICE VISIT (OUTPATIENT)
Dept: ORTHOPEDIC SURGERY | Facility: CLINIC | Age: 68
End: 2023-02-17
Payer: MEDICARE

## 2023-02-17 VITALS — HEIGHT: 67 IN | WEIGHT: 150 LBS | BODY MASS INDEX: 23.54 KG/M2 | TEMPERATURE: 98.6 F

## 2023-02-17 DIAGNOSIS — M54.16 LUMBAR RADICULOPATHY: Primary | ICD-10-CM

## 2023-02-17 DIAGNOSIS — M70.62 GREATER TROCHANTERIC BURSITIS OF LEFT HIP: ICD-10-CM

## 2023-02-17 DIAGNOSIS — R52 PAIN: ICD-10-CM

## 2023-02-17 PROCEDURE — 99213 OFFICE O/P EST LOW 20 MIN: CPT | Performed by: NURSE PRACTITIONER

## 2023-02-17 PROCEDURE — 72100 X-RAY EXAM L-S SPINE 2/3 VWS: CPT | Performed by: NURSE PRACTITIONER

## 2023-02-17 RX ORDER — ROSUVASTATIN CALCIUM 20 MG/1
TABLET, COATED ORAL
COMMUNITY
Start: 2023-02-07

## 2023-02-17 RX ORDER — LISINOPRIL 10 MG/1
10 TABLET ORAL DAILY
COMMUNITY
Start: 2023-01-30 | End: 2023-07-29

## 2023-02-17 NOTE — TELEPHONE ENCOUNTER
Caller: Albert Tellez    Relationship to patient: Self    Best call back number:     Patient is needing: UNABLE TO WARM TRANSFER.  PATIENT BELIEVES HE LOST HIS HEARING AID IN THE OFFICE SOMEWHERE

## 2023-02-17 NOTE — PROGRESS NOTES
Patient Name: Albert Tellez   YOB: 1955  Referring Primary Care Physician: Debra Narayan APRN      Chief Complaint:    Chief Complaint   Patient presents with   • Lumbar Spine - Initial Evaluation, Pain        HPI:  Albert Tellez is a 68 y.o. male who presents to [facility] for evaluation of left hip pain. This is an established patient of practice who recently saw LIANNE Samson for evaluation of hip pain.  He has a history of total hip resurfacing.  Hip MRI was obtained and based on results and prior history of L3-L5 laminectomy with Dr. Pinzon in 2018 r, he was sent for evaluation.  When asked about his pain, he points directly to the left greater trochanter.  He does get pain referring into the lateral thigh as well.  He says pain is worse in the afternoon and with walking.  He says he can ride a stationary bike for as long as he wants without pain.  Generally the pain is minimal in the morning and progresses around 2 PM after he has been active.  Current symptoms have been present for about 2 months.  He denies any injury.  No bowel or bladder dysfunction.  Prior pertinent records were reviewed.    PFSH:  See attached    ROS: As per HPI, otherwise negative    Objective:    Vitals:    02/17/23 0915   Temp: 98.6 °F (37 °C)     Body mass index is 23.49 kg/m².      Physical Exam  Constitutional:       Appearance: He is well-developed and well-nourished.   Eyes:      General: No scleral icterus.  Skin:     General: Skin is intact.   Neurological:      Mental Status: He is alert.      Gait: Gait is intact.   Psychiatric:         Mood and Affect: Mood and affect normal.       Spine Musculoskeletal Exam    Gait    Gait is normal.    Palpation    Thoracolumbar    Tenderness: present      Greater trochanter: left    Right      Muscle tone: normal    Left      Muscle tone: normal    Strength    Thoracolumbar    Thoracolumbar motor exam is normal.     Sensory     Thoracolumbar    Thoracolumbar sensation is normal.    Reflexes    Right      Quadriceps: 1/4      Achilles: 0/4     Left      Quadriceps: 1/4      Achilles: 0/4    Special Tests    Thoracolumbar      Right      ANUSHKA test: negative      SLR: no back or leg pain      Left      ANUSHKA test: negative      SLR: no back or leg pain    General      Constitutional: well-developed and well-nourished    Scleral icterus: no    Labored breathing: no    Psychiatric: normal mood and affect and no acute distress    Neurological: alert and oriented x3    Skin: intact        IMAGING:     Indication: pain related symptoms,  Views: 2V AP&LAT lumbar  Findings: Reviewed and reveals progressive anterolisthesis L4 in relation to L3 and L5 and progressed retrolisthesis L1 on L2 and L2 and L3 since 2019      Assessment:           Diagnoses and all orders for this visit:    1. Lumbar radiculopathy (Primary)  -     MRI Lumbar Spine With & Without Contrast; Future  -     Ambulatory Referral to Physical Therapy Evaluate and treat    2. Pain  -     XR Spine Lumbar AP & Lateral    3. Greater trochanteric bursitis of left hip  -     Ambulatory Referral to Physical Therapy Evaluate and treat             Plan:  He seems to have symptoms overlapping from greater trochanteric bursitis and recent MRI did show some fluid in the left greater trochanteric bursa extending into the left IT tract.  I would like for him to get at least 1 GTB injection to see if he gains any relief as a test and treatment.  Also obtain lumbar MRI to rule out any nerve root compression as he does have some pain referring down the lateral thigh.  We will also refer him to physical therapy to treat both the back and the hip which also help isolate the primary pain generator.  We will have him follow-up after MRI and therapy to discuss next level of treatment which may include referral to Dr. Goldman.    Return for After radiographic tests.

## 2023-02-21 NOTE — TELEPHONE ENCOUNTER
LOOKED EVERY WHERE AND DID NOT SEE ANY HEARING AIDES. CALLED XRAY AND THEY HAVENT SEEN ANY EITHER. CALLED PATIENT ANS LVM

## 2023-03-10 ENCOUNTER — HOSPITAL ENCOUNTER (OUTPATIENT)
Dept: MRI IMAGING | Facility: HOSPITAL | Age: 68
Discharge: HOME | End: 2023-03-10
Admitting: NURSE PRACTITIONER
Payer: MEDICARE

## 2023-03-10 DIAGNOSIS — M54.16 LUMBAR RADICULOPATHY: ICD-10-CM

## 2023-03-10 LAB — CREAT BLDA-MCNC: 1 MG/DL (ref 0.6–1.3)

## 2023-03-10 PROCEDURE — 82565 ASSAY OF CREATININE: CPT

## 2023-03-10 PROCEDURE — 0 GADOBENATE DIMEGLUMINE 529 MG/ML SOLUTION: Performed by: NURSE PRACTITIONER

## 2023-03-10 PROCEDURE — 72158 MRI LUMBAR SPINE W/O & W/DYE: CPT

## 2023-03-10 PROCEDURE — A9577 INJ MULTIHANCE: HCPCS | Performed by: NURSE PRACTITIONER

## 2023-03-10 RX ADMIN — GADOBENATE DIMEGLUMINE 13 ML: 529 INJECTION, SOLUTION INTRAVENOUS at 17:41

## 2023-03-14 NOTE — PROGRESS NOTES
Let him know he does have some arthritic changes in the lumbar spine and narrowing around the nerves particularly to the left at L4L5. Finish therapy and have him follow up afterwards to discuss next steps depending on how he responds to therapy

## 2023-03-14 NOTE — PROGRESS NOTES
I called pt no answer I left VM for pt to call the office back. Once pt calls back we need to advise him of MRI results per LIANNE Villareal

## 2023-03-15 ENCOUNTER — OFFICE VISIT (OUTPATIENT)
Dept: ORTHOPEDIC SURGERY | Facility: CLINIC | Age: 68
End: 2023-03-15
Payer: MEDICARE

## 2023-03-15 VITALS — BODY MASS INDEX: 23.54 KG/M2 | TEMPERATURE: 97.6 F | HEIGHT: 67 IN | WEIGHT: 150 LBS

## 2023-03-15 DIAGNOSIS — M54.16 LUMBAR RADICULOPATHY: ICD-10-CM

## 2023-03-15 DIAGNOSIS — M43.16 SPONDYLOLISTHESIS OF LUMBAR REGION: Primary | ICD-10-CM

## 2023-03-15 DIAGNOSIS — R52 PAIN: ICD-10-CM

## 2023-03-15 PROCEDURE — 72120 X-RAY BEND ONLY L-S SPINE: CPT | Performed by: NURSE PRACTITIONER

## 2023-03-15 PROCEDURE — 1160F RVW MEDS BY RX/DR IN RCRD: CPT | Performed by: NURSE PRACTITIONER

## 2023-03-15 PROCEDURE — 1159F MED LIST DOCD IN RCRD: CPT | Performed by: NURSE PRACTITIONER

## 2023-03-15 PROCEDURE — 99213 OFFICE O/P EST LOW 20 MIN: CPT | Performed by: NURSE PRACTITIONER

## 2023-03-15 NOTE — PROGRESS NOTES
Patient Name: Albert Tellez   YOB: 1955  Referring Primary Care Physician: Debra Narayan APRN      Chief Complaint:    Chief Complaint   Patient presents with   • Lumbar Spine - Follow-up, Pain        HPI:  Albert Tellez is a 68 y.o. male who presents to [facility] for follow-up of lumbar MRI.  MRI was ordered for complaint of low back pain referring into the left hip.  He had been evaluated by Ortho for the hip and with history of L3-5 laminectomy in 2018 and concern for radicular symptoms he was sent for evaluation.  He maintains the pain is primarily in the left low back referring into the left hip and upper thigh.  Symptoms are aggravated by prolonged walking and gets worse throughout the day.  Interestingly he has no pain while riding his bike which she does recreationally and for transportation.  No bowel or bladder dysfunction or saddle anesthesia.      PFSH:  See attached    ROS: As per HPI, otherwise negative    Objective:    Vitals:    03/15/23 1513   Temp: 97.6 °F (36.4 °C)     Body mass index is 23.49 kg/m².      No change since 2/17/2023.      IMAGING:     Indication: Rule out dynamic instability,  Views: Lumbar flexion-extension     Findings: Reviewed and reveals nearly grade 2 anterolisthesis L4 in relation to L3 and L5 and minimal spondylolisthesis L1-2 and L2-3 which does not appear to move significantly in flexion extension  Comparison views: Anterolisthesis at L4 was not present in 2018 and significantly progressed since 2019    Lumbar MRI 3/10/2023 at [facility] was reviewed and reveals expected postop change with decompression and new L4 pars defect bilaterally, subluxation at multiple levels primarily of poor, chronic multilevel foraminal narrowing worse at L4-5 to the left.  No significant central stenosis.    Assessment:           Diagnoses and all orders for this visit:    1. Spondylolisthesis of lumbar region (Primary)    2.  Lumbar radiculopathy    3. Pain  -     Ambulatory Referral to Neurosurgery  -     XR Spine Lumbar Flex & Ext             Plan:  We discussed referral to physical therapy, but patient has done this in the past and remains very active.  We also discussed pain management but he wants to avoid any short-term treatment that may not offer significant long-term relief.  He wants to remain active and is finding this more more difficult.  He would like to have surgical evaluation so  will refer him to Dr. Goldman to discuss his options and he understands this will likely include a fusion procedure.      No follow-ups on file.

## 2023-03-21 ENCOUNTER — TELEPHONE (OUTPATIENT)
Dept: ORTHOPEDIC SURGERY | Facility: CLINIC | Age: 68
End: 2023-03-21

## 2023-03-21 NOTE — TELEPHONE ENCOUNTER
Caller:  [facility]    Relationship: PHYSICAL THERAPY    Best call back number:  [phone]    What orders are you requesting (i.e. lab or imaging):  PT ORDER FOR LUMBAR SPINE AND LEFT HIP IN Epic 2/17/23        Where will you receive your lab/imaging services: PLEASE FAX ORDER TO [facility] @ [phone]

## 2023-03-24 ENCOUNTER — TELEPHONE (OUTPATIENT)
Dept: NEUROSURGERY | Facility: CLINIC | Age: 68
End: 2023-03-24
Payer: COMMERCIAL

## 2023-03-24 NOTE — TELEPHONE ENCOUNTER
Called the patient to get him scheduled to see Dr. Goldman per Flavia's last OV. He can be scheduled for a Friday that Dr. Goldman is in the [facility].    Flavia's OV for 3/29/2023 cancelled per her request.

## 2023-03-24 NOTE — TELEPHONE ENCOUNTER
PT WILL GET A SECOND OPINION FROM DIFFERENT PROVIDER. PLEASE DO NOT CALL TO SCHEDULE WITH RICHY RASHID OR JULIAN AT THIS TIME. PT WILL CALL BACK IF NEEDED. THANKS

## (undated) DEVICE — ELECTRD BLD EXT EDGE 1P COAT 6.5IN STRL

## (undated) DEVICE — SUT VIC 1 OS8 27IN J699H

## (undated) DEVICE — ENCORE® LATEX ORTHO SIZE 8, STERILE LATEX POWDER-FREE SURGICAL GLOVE: Brand: ENCORE

## (undated) DEVICE — DRSNG GZ PETROLTM XEROFORM CURAD 1X8IN STRL

## (undated) DEVICE — APPL DURAPREP IODOPHOR APL 26ML

## (undated) DEVICE — NDL SPINE 18G 31/2IN PNK

## (undated) DEVICE — SYR LL 3CC

## (undated) DEVICE — GLV SURG BIOGEL LTX PF 7 1/2

## (undated) DEVICE — ADHS SKIN DERMABOND TOP ADVANCED

## (undated) DEVICE — OCCLUSIVE GAUZE STRIP,3% BISMUTH TRIBROMOPHENATE IN PETROLATUM BLEND: Brand: XEROFORM

## (undated) DEVICE — ANTIBACTERIAL UNDYED BRAIDED (POLYGLACTIN 910), SYNTHETIC ABSORBABLE SUTURE: Brand: COATED VICRYL

## (undated) DEVICE — SPONGE,LAP,12"X12",XR,ST,5/PK,40PK/CS: Brand: MEDLINE

## (undated) DEVICE — PK NEURO SPINE 40

## (undated) DEVICE — DISPOSABLE BIPOLAR CABLE 12FT. (3.6M): Brand: KIRWAN

## (undated) DEVICE — CONN TBG Y 5 IN 1 LF STRL

## (undated) DEVICE — GOWN,ECLIPSE,FABRIC-REINFORCED,X-LARGE: Brand: MEDLINE

## (undated) DEVICE — GLV SURG BIOGEL LTX PF 7

## (undated) DEVICE — SUT MNCRYL PLS ANTIB UD 4/0 PS2 18IN

## (undated) DEVICE — SYR LUERLOK 20CC

## (undated) DEVICE — MEDI-VAC YANKAUER SUCTION HANDLE W/BULBOUS TIP: Brand: CARDINAL HEALTH

## (undated) DEVICE — 6.0MM PRECISION ROUND